# Patient Record
Sex: FEMALE | Race: OTHER | URBAN - METROPOLITAN AREA
[De-identification: names, ages, dates, MRNs, and addresses within clinical notes are randomized per-mention and may not be internally consistent; named-entity substitution may affect disease eponyms.]

---

## 2019-02-15 ENCOUNTER — INPATIENT (INPATIENT)
Facility: HOSPITAL | Age: 29
LOS: 2 days | Discharge: HOME | DRG: 832 | End: 2019-02-18
Attending: OBSTETRICS & GYNECOLOGY | Admitting: OBSTETRICS & GYNECOLOGY
Payer: COMMERCIAL

## 2019-02-15 VITALS
HEART RATE: 82 BPM | RESPIRATION RATE: 16 BRPM | HEIGHT: 65 IN | WEIGHT: 115.08 LBS | DIASTOLIC BLOOD PRESSURE: 63 MMHG | OXYGEN SATURATION: 99 % | SYSTOLIC BLOOD PRESSURE: 95 MMHG | TEMPERATURE: 98 F

## 2019-02-15 DIAGNOSIS — Z98.890 OTHER SPECIFIED POSTPROCEDURAL STATES: Chronic | ICD-10-CM

## 2019-02-15 DIAGNOSIS — O21.0 MILD HYPEREMESIS GRAVIDARUM: ICD-10-CM

## 2019-02-15 LAB
ALBUMIN SERPL ELPH-MCNC: 3.8 G/DL — SIGNIFICANT CHANGE UP (ref 3.5–5)
ALP SERPL-CCNC: 40 U/L — SIGNIFICANT CHANGE UP (ref 40–120)
ALT FLD-CCNC: 15 U/L DA — SIGNIFICANT CHANGE UP (ref 10–60)
ANION GAP SERPL CALC-SCNC: 8 MMOL/L — SIGNIFICANT CHANGE UP (ref 5–17)
APPEARANCE UR: CLEAR — SIGNIFICANT CHANGE UP
AST SERPL-CCNC: 11 U/L — SIGNIFICANT CHANGE UP (ref 10–40)
BASOPHILS # BLD AUTO: 0.06 K/UL — SIGNIFICANT CHANGE UP (ref 0–0.2)
BASOPHILS NFR BLD AUTO: 0.8 % — SIGNIFICANT CHANGE UP (ref 0–2)
BILIRUB SERPL-MCNC: 0.8 MG/DL — SIGNIFICANT CHANGE UP (ref 0.2–1.2)
BILIRUB UR-MCNC: NEGATIVE — SIGNIFICANT CHANGE UP
BUN SERPL-MCNC: 13 MG/DL — SIGNIFICANT CHANGE UP (ref 7–18)
CALCIUM SERPL-MCNC: 9.3 MG/DL — SIGNIFICANT CHANGE UP (ref 8.4–10.5)
CHLORIDE SERPL-SCNC: 104 MMOL/L — SIGNIFICANT CHANGE UP (ref 96–108)
CO2 SERPL-SCNC: 25 MMOL/L — SIGNIFICANT CHANGE UP (ref 22–31)
COLOR SPEC: YELLOW — SIGNIFICANT CHANGE UP
CREAT SERPL-MCNC: 0.67 MG/DL — SIGNIFICANT CHANGE UP (ref 0.5–1.3)
DIFF PNL FLD: ABNORMAL
EOSINOPHIL # BLD AUTO: 0.16 K/UL — SIGNIFICANT CHANGE UP (ref 0–0.5)
EOSINOPHIL NFR BLD AUTO: 2.1 % — SIGNIFICANT CHANGE UP (ref 0–6)
GLUCOSE SERPL-MCNC: 80 MG/DL — SIGNIFICANT CHANGE UP (ref 70–99)
GLUCOSE UR QL: NEGATIVE — SIGNIFICANT CHANGE UP
HCT VFR BLD CALC: 40.4 % — SIGNIFICANT CHANGE UP (ref 34.5–45)
HGB BLD-MCNC: 13.6 G/DL — SIGNIFICANT CHANGE UP (ref 11.5–15.5)
IMM GRANULOCYTES NFR BLD AUTO: 0.1 % — SIGNIFICANT CHANGE UP (ref 0–1.5)
KETONES UR-MCNC: ABNORMAL
LEUKOCYTE ESTERASE UR-ACNC: ABNORMAL
LYMPHOCYTES # BLD AUTO: 2.81 K/UL — SIGNIFICANT CHANGE UP (ref 1–3.3)
LYMPHOCYTES # BLD AUTO: 37.6 % — SIGNIFICANT CHANGE UP (ref 13–44)
MCHC RBC-ENTMCNC: 30.5 PG — SIGNIFICANT CHANGE UP (ref 27–34)
MCHC RBC-ENTMCNC: 33.7 GM/DL — SIGNIFICANT CHANGE UP (ref 32–36)
MCV RBC AUTO: 90.6 FL — SIGNIFICANT CHANGE UP (ref 80–100)
MONOCYTES # BLD AUTO: 0.67 K/UL — SIGNIFICANT CHANGE UP (ref 0–0.9)
MONOCYTES NFR BLD AUTO: 9 % — SIGNIFICANT CHANGE UP (ref 2–14)
NEUTROPHILS # BLD AUTO: 3.77 K/UL — SIGNIFICANT CHANGE UP (ref 1.8–7.4)
NEUTROPHILS NFR BLD AUTO: 50.4 % — SIGNIFICANT CHANGE UP (ref 43–77)
NITRITE UR-MCNC: NEGATIVE — SIGNIFICANT CHANGE UP
NRBC # BLD: 0 /100 WBCS — SIGNIFICANT CHANGE UP (ref 0–0)
PH UR: 6.5 — SIGNIFICANT CHANGE UP (ref 5–8)
PLATELET # BLD AUTO: 210 K/UL — SIGNIFICANT CHANGE UP (ref 150–400)
POTASSIUM SERPL-MCNC: 3.9 MMOL/L — SIGNIFICANT CHANGE UP (ref 3.5–5.3)
POTASSIUM SERPL-SCNC: 3.9 MMOL/L — SIGNIFICANT CHANGE UP (ref 3.5–5.3)
PROT SERPL-MCNC: 7.8 G/DL — SIGNIFICANT CHANGE UP (ref 6–8.3)
PROT UR-MCNC: 30 MG/DL
RBC # BLD: 4.46 M/UL — SIGNIFICANT CHANGE UP (ref 3.8–5.2)
RBC # FLD: 11.6 % — SIGNIFICANT CHANGE UP (ref 10.3–14.5)
SODIUM SERPL-SCNC: 137 MMOL/L — SIGNIFICANT CHANGE UP (ref 135–145)
SP GR SPEC: 1.01 — SIGNIFICANT CHANGE UP (ref 1.01–1.02)
UROBILINOGEN FLD QL: 1
WBC # BLD: 7.48 K/UL — SIGNIFICANT CHANGE UP (ref 3.8–10.5)
WBC # FLD AUTO: 7.48 K/UL — SIGNIFICANT CHANGE UP (ref 3.8–10.5)

## 2019-02-15 PROCEDURE — 99284 EMERGENCY DEPT VISIT MOD MDM: CPT

## 2019-02-15 PROCEDURE — 76817 TRANSVAGINAL US OBSTETRIC: CPT | Mod: 26

## 2019-02-15 PROCEDURE — 76801 OB US < 14 WKS SINGLE FETUS: CPT | Mod: 26

## 2019-02-15 PROCEDURE — 76815 OB US LIMITED FETUS(S): CPT | Mod: 26

## 2019-02-15 RX ORDER — ONDANSETRON 8 MG/1
4 TABLET, FILM COATED ORAL EVERY 6 HOURS
Qty: 0 | Refills: 0 | Status: DISCONTINUED | OUTPATIENT
Start: 2019-02-15 | End: 2019-02-17

## 2019-02-15 RX ORDER — ONDANSETRON 8 MG/1
4 TABLET, FILM COATED ORAL ONCE
Qty: 0 | Refills: 0 | Status: COMPLETED | OUTPATIENT
Start: 2019-02-15 | End: 2019-02-15

## 2019-02-15 RX ORDER — FAMOTIDINE 10 MG/ML
20 INJECTION INTRAVENOUS
Qty: 0 | Refills: 0 | Status: DISCONTINUED | OUTPATIENT
Start: 2019-02-15 | End: 2019-02-16

## 2019-02-15 RX ORDER — SODIUM CHLORIDE 9 MG/ML
1000 INJECTION, SOLUTION INTRAVENOUS
Qty: 0 | Refills: 0 | Status: DISCONTINUED | OUTPATIENT
Start: 2019-02-15 | End: 2019-02-18

## 2019-02-15 RX ORDER — SODIUM CHLORIDE 9 MG/ML
1000 INJECTION INTRAMUSCULAR; INTRAVENOUS; SUBCUTANEOUS ONCE
Qty: 0 | Refills: 0 | Status: COMPLETED | OUTPATIENT
Start: 2019-02-15 | End: 2019-02-15

## 2019-02-15 RX ADMIN — ONDANSETRON 4 MILLIGRAM(S): 8 TABLET, FILM COATED ORAL at 11:59

## 2019-02-15 RX ADMIN — ONDANSETRON 4 MILLIGRAM(S): 8 TABLET, FILM COATED ORAL at 17:33

## 2019-02-15 RX ADMIN — SODIUM CHLORIDE 1000 MILLILITER(S): 9 INJECTION INTRAMUSCULAR; INTRAVENOUS; SUBCUTANEOUS at 11:59

## 2019-02-15 RX ADMIN — FAMOTIDINE 20 MILLIGRAM(S): 10 INJECTION INTRAVENOUS at 17:34

## 2019-02-15 RX ADMIN — SODIUM CHLORIDE 125 MILLILITER(S): 9 INJECTION, SOLUTION INTRAVENOUS at 14:39

## 2019-02-15 RX ADMIN — Medication 25 MILLIGRAM(S): at 14:39

## 2019-02-15 NOTE — H&P ADULT - HISTORY OF PRESENT ILLNESS
30 year old female,  presents with "nausea and vomiting for 2 weeks". She is currently 8 weeks pregnant with LMP 2017. Pt states symptoms were exacerbated with any food and drink and describes the vomitting as non bloody, mucousy and yellowish in color.  Pt states she has not been able to keep anything down. Pt was given Diclegis and Zofran and found no relief. Pt states while in ER she has vomitted about 5 times even after being given medication. Pt admits to 5-7 pound weight loss over the past two weeks. She denies the following: fever, chills. abdominal pain. flank pain, dysuria, urgency, frequency, pelvic pain. She denies any recent travel.  POb/gynHx: primigravida, denies SAB or TOP, denies ovarian cysts, fibroids, stds, last pap last week, awaiting results   Allergies: NKA  Meds: PNV, Diclegis, zofran  PMHx: denies  PSHx: denies   PsocHx: denies x 3     Gen: A&O x 3, in mild distress due to vomitting at this time   Chest: CTA B/L  Cardiac: S1,S2  RRR  Abdomen: +BS; soft; Nontender, nondistended, no rebound or guarding   Extremities: Nontender, no edema    A/P: 30 year old female,  at 8 weeks gestation with hypermesis gravidarum   -admit to GYN   -keep NPO   -start Phenergan atc, Pepcid atc, Benadryl atc, vitamin b6 atc   -banana bag   -fetal sono   -cbc/cmp/acetone/prealbumin/triglyceride in AM  -UA in AM  -Nutrition consult   -case d/w Dr. Mercedes 28 year old female,  presents with "nausea and vomiting for 4 weeks". She is currently 10 weeks pregnant with LMP 2018. Pt states symptoms were exacerbated with any food and drink and describes the vomitting as non bloody, mucousy and yellowish in color.  Pt states she has not been able to keep anything down. Pt was given Diclegis and found no relief. She denies the following: fever, chills. abdominal pain. flank pain, dysuria, urgency, frequency, pelvic pain, vaginal bleeding. She denies any recent travel.  POb/gynHx: TOP w/o d&c x 1, hx of endometriosis with endometriosis s/p b/l ovarian cystectomy; denies fibroids, stds, last pap wnl, sexually active with 1 partner   Allergies: sulfa (hives), morphine (anaphylaxis)  Meds: PNV, Diclegis  PMHx: denies  PSHx: lap ovarian cystectomy b/l   PsocHx: denies x 3

## 2019-02-15 NOTE — H&P ADULT - ASSESSMENT
A/P: 28 year old female,  at 10 weeks gestation with hypermesis gravidarum   -admit to GYN   -keep NPO   -start Phenergan atc, Pepcid atc, Benadryl atc, vitamin b6 atc   -banana bag   -fetal sono   -cbc/cmp/acetone/prealbumin/triglyceride in AM  -UA in AM  -Nutrition consult   -case d/w Dr. Young A/P: 28 year old female,  at 10 weeks gestation with hypermesis gravidarum   -admit to GYN   -keep NPO   -start zofran atc Phenergan atc, Pepcid atc  -banana bag   -fetal sono   -cbc/cmp/acetone/prealbumin/triglyceride in AM  -UA in AM  -Nutrition consult   -case d/w Dr. Young

## 2019-02-15 NOTE — ED PROVIDER NOTE - CLINICAL SUMMARY MEDICAL DECISION MAKING FREE TEXT BOX
28 F present with hyper emesis. Care discussed with OB. Will obtain basic labs, hydrate, treat with IV zofran and admit.

## 2019-02-15 NOTE — ED PROVIDER NOTE - OBJECTIVE STATEMENT
29 yo female presenting with 1 month hx of nausea and vomiting.  Patient was sent in by OB doctor for admission, had urine done last week which showed significant dehydration.  Patient vomits 3-5x everyday.  Has been taking Diclegis but has not been able to keep it down.  Denies urinary symptoms or abdominal pain.  11 weeks pregnant.

## 2019-02-15 NOTE — ED PROVIDER NOTE - NS ED ROS FT
Constitutional: no fever  Eyes: no conjunctivitis  Ears: no ear pain   Nose: no nasal congestion, Mouth/Throat: no throat pain, Neck: no stiffness  Cardiovascular: no chest pain  Chest: no cough  Gastrointestinal: no abdominal pain, + vomiting and no diarrhea  MSK: no joint pain  : no dysuria  Skin: no rash  Neuro: no LOC

## 2019-02-15 NOTE — ED PROVIDER NOTE - ATTENDING CONTRIBUTION TO CARE
I completed an independent physical examination.   I have signed out the follow up of any pending tests (i.e. labs, radiological studies) to the Resident.  I have discussed the patient’s plan of care and disposition with the resident.    27 yo F with hyperemesis sent by OBGYN for admission. Care discussed with OB. Will obtain basic labs and admit to GYN.

## 2019-02-16 ENCOUNTER — TRANSCRIPTION ENCOUNTER (OUTPATIENT)
Age: 29
End: 2019-02-16

## 2019-02-16 DIAGNOSIS — O21.0 MILD HYPEREMESIS GRAVIDARUM: ICD-10-CM

## 2019-02-16 LAB
ACETONE SERPL-MCNC: NEGATIVE — SIGNIFICANT CHANGE UP
ALBUMIN SERPL ELPH-MCNC: 2.8 G/DL — LOW (ref 3.5–5)
ALP SERPL-CCNC: 32 U/L — LOW (ref 40–120)
ALT FLD-CCNC: 12 U/L DA — SIGNIFICANT CHANGE UP (ref 10–60)
ANION GAP SERPL CALC-SCNC: 9 MMOL/L — SIGNIFICANT CHANGE UP (ref 5–17)
APPEARANCE UR: CLEAR — SIGNIFICANT CHANGE UP
AST SERPL-CCNC: 9 U/L — LOW (ref 10–40)
BASOPHILS # BLD AUTO: 0.05 K/UL — SIGNIFICANT CHANGE UP (ref 0–0.2)
BASOPHILS NFR BLD AUTO: 0.8 % — SIGNIFICANT CHANGE UP (ref 0–2)
BILIRUB SERPL-MCNC: 1 MG/DL — SIGNIFICANT CHANGE UP (ref 0.2–1.2)
BILIRUB UR-MCNC: NEGATIVE — SIGNIFICANT CHANGE UP
BUN SERPL-MCNC: 9 MG/DL — SIGNIFICANT CHANGE UP (ref 7–18)
CALCIUM SERPL-MCNC: 8.2 MG/DL — LOW (ref 8.4–10.5)
CHLORIDE SERPL-SCNC: 106 MMOL/L — SIGNIFICANT CHANGE UP (ref 96–108)
CO2 SERPL-SCNC: 21 MMOL/L — LOW (ref 22–31)
COLOR SPEC: YELLOW — SIGNIFICANT CHANGE UP
CREAT SERPL-MCNC: 0.66 MG/DL — SIGNIFICANT CHANGE UP (ref 0.5–1.3)
CULTURE RESULTS: NO GROWTH — SIGNIFICANT CHANGE UP
DIFF PNL FLD: ABNORMAL
EOSINOPHIL # BLD AUTO: 0.1 K/UL — SIGNIFICANT CHANGE UP (ref 0–0.5)
EOSINOPHIL NFR BLD AUTO: 1.6 % — SIGNIFICANT CHANGE UP (ref 0–6)
GLUCOSE SERPL-MCNC: 59 MG/DL — LOW (ref 70–99)
GLUCOSE UR QL: NEGATIVE — SIGNIFICANT CHANGE UP
HCT VFR BLD CALC: 33.9 % — LOW (ref 34.5–45)
HGB BLD-MCNC: 11.3 G/DL — LOW (ref 11.5–15.5)
IMM GRANULOCYTES NFR BLD AUTO: 0.2 % — SIGNIFICANT CHANGE UP (ref 0–1.5)
KETONES UR-MCNC: ABNORMAL
LEUKOCYTE ESTERASE UR-ACNC: ABNORMAL
LYMPHOCYTES # BLD AUTO: 1.89 K/UL — SIGNIFICANT CHANGE UP (ref 1–3.3)
LYMPHOCYTES # BLD AUTO: 30.7 % — SIGNIFICANT CHANGE UP (ref 13–44)
MCHC RBC-ENTMCNC: 31 PG — SIGNIFICANT CHANGE UP (ref 27–34)
MCHC RBC-ENTMCNC: 33.3 GM/DL — SIGNIFICANT CHANGE UP (ref 32–36)
MCV RBC AUTO: 92.9 FL — SIGNIFICANT CHANGE UP (ref 80–100)
MONOCYTES # BLD AUTO: 0.43 K/UL — SIGNIFICANT CHANGE UP (ref 0–0.9)
MONOCYTES NFR BLD AUTO: 7 % — SIGNIFICANT CHANGE UP (ref 2–14)
NEUTROPHILS # BLD AUTO: 3.68 K/UL — SIGNIFICANT CHANGE UP (ref 1.8–7.4)
NEUTROPHILS NFR BLD AUTO: 59.7 % — SIGNIFICANT CHANGE UP (ref 43–77)
NITRITE UR-MCNC: NEGATIVE — SIGNIFICANT CHANGE UP
NRBC # BLD: 0 /100 WBCS — SIGNIFICANT CHANGE UP (ref 0–0)
PH UR: 6 — SIGNIFICANT CHANGE UP (ref 5–8)
PLATELET # BLD AUTO: 152 K/UL — SIGNIFICANT CHANGE UP (ref 150–400)
POTASSIUM SERPL-MCNC: 3.9 MMOL/L — SIGNIFICANT CHANGE UP (ref 3.5–5.3)
POTASSIUM SERPL-SCNC: 3.9 MMOL/L — SIGNIFICANT CHANGE UP (ref 3.5–5.3)
PREALB SERPL-MCNC: 16 MG/DL — LOW (ref 20–40)
PROT SERPL-MCNC: 5.9 G/DL — LOW (ref 6–8.3)
PROT UR-MCNC: NEGATIVE — SIGNIFICANT CHANGE UP
RBC # BLD: 3.65 M/UL — LOW (ref 3.8–5.2)
RBC # FLD: 11.8 % — SIGNIFICANT CHANGE UP (ref 10.3–14.5)
SODIUM SERPL-SCNC: 136 MMOL/L — SIGNIFICANT CHANGE UP (ref 135–145)
SP GR SPEC: 1.01 — SIGNIFICANT CHANGE UP (ref 1.01–1.02)
SPECIMEN SOURCE: SIGNIFICANT CHANGE UP
TRIGL SERPL-MCNC: 60 MG/DL — SIGNIFICANT CHANGE UP (ref 10–149)
UROBILINOGEN FLD QL: NEGATIVE — SIGNIFICANT CHANGE UP
WBC # BLD: 6.16 K/UL — SIGNIFICANT CHANGE UP (ref 3.8–10.5)
WBC # FLD AUTO: 6.16 K/UL — SIGNIFICANT CHANGE UP (ref 3.8–10.5)

## 2019-02-16 RX ORDER — FAMOTIDINE 10 MG/ML
20 INJECTION INTRAVENOUS
Qty: 0 | Refills: 0 | Status: DISCONTINUED | OUTPATIENT
Start: 2019-02-16 | End: 2019-02-18

## 2019-02-16 RX ORDER — ONDANSETRON 8 MG/1
4 TABLET, FILM COATED ORAL EVERY 6 HOURS
Qty: 0 | Refills: 0 | Status: DISCONTINUED | OUTPATIENT
Start: 2019-02-16 | End: 2019-02-18

## 2019-02-16 RX ORDER — SODIUM CHLORIDE 9 MG/ML
1000 INJECTION, SOLUTION INTRAVENOUS ONCE
Qty: 0 | Refills: 0 | Status: COMPLETED | OUTPATIENT
Start: 2019-02-16 | End: 2019-02-17

## 2019-02-16 RX ORDER — ONDANSETRON 8 MG/1
1 TABLET, FILM COATED ORAL
Qty: 90 | Refills: 0 | OUTPATIENT
Start: 2019-02-16 | End: 2019-03-17

## 2019-02-16 RX ADMIN — FAMOTIDINE 20 MILLIGRAM(S): 10 INJECTION INTRAVENOUS at 23:25

## 2019-02-16 RX ADMIN — ONDANSETRON 4 MILLIGRAM(S): 8 TABLET, FILM COATED ORAL at 06:37

## 2019-02-16 RX ADMIN — FAMOTIDINE 20 MILLIGRAM(S): 10 INJECTION INTRAVENOUS at 06:36

## 2019-02-16 RX ADMIN — FAMOTIDINE 20 MILLIGRAM(S): 10 INJECTION INTRAVENOUS at 17:45

## 2019-02-16 RX ADMIN — ONDANSETRON 4 MILLIGRAM(S): 8 TABLET, FILM COATED ORAL at 12:19

## 2019-02-16 RX ADMIN — SODIUM CHLORIDE 125 MILLILITER(S): 9 INJECTION, SOLUTION INTRAVENOUS at 12:19

## 2019-02-16 RX ADMIN — ONDANSETRON 4 MILLIGRAM(S): 8 TABLET, FILM COATED ORAL at 00:06

## 2019-02-16 RX ADMIN — Medication 25 MILLIGRAM(S): at 23:25

## 2019-02-16 RX ADMIN — Medication 25 MILLIGRAM(S): at 16:07

## 2019-02-16 RX ADMIN — ONDANSETRON 4 MILLIGRAM(S): 8 TABLET, FILM COATED ORAL at 17:45

## 2019-02-16 NOTE — DISCHARGE NOTE ADULT - CARE PLAN
Principal Discharge DX:	Hyperemesis gravidarum  Goal:	IV fluids with vitamins, PO challenge, fetal sono  Assessment and plan of treatment:	continue zofran and phenergen as prescribed, continue regular diet as tolerated or BRAT diet, f/u with Dr. Young on Tuesday in the office

## 2019-02-16 NOTE — DISCHARGE NOTE ADULT - ADDITIONAL INSTRUCTIONS
continue zofran and phenergen as prescribed, continue regular diet as tolerated or BRAT diet, f/u with Dr. Young on Tuesday in the office

## 2019-02-16 NOTE — DISCHARGE NOTE ADULT - PLAN OF CARE
IV fluids with vitamins, PO challenge, fetal sono continue zofran and phenergen as prescribed, continue regular diet as tolerated or BRAT diet, f/u with Dr. Young on Tuesday in the office

## 2019-02-16 NOTE — DISCHARGE NOTE ADULT - PATIENT PORTAL LINK FT
You can access the [facility] Patient Portal, offered by [facility], by registering with the following website: [email or web address]

## 2019-02-16 NOTE — PROGRESS NOTE ADULT - SUBJECTIVE AND OBJECTIVE BOX
GYN PA Progress Note HD#2    Patient seen and reevaluated at bedside. Offers no complaints at this time. Tolerated clear diet for breakfast. Denies abdominal pain, vaginal bleeding, vaginal discharge, vomiting, dizziness.  Vital Signs Last 24 Hrs  T(C): 37 (16 Feb 2019 05:18), Max: 37.1 (15 Feb 2019 15:56)  T(F): 98.6 (16 Feb 2019 05:18), Max: 98.8 (15 Feb 2019 15:56)  HR: 60 (16 Feb 2019 05:18) (60 - 82)  BP: 91/50 (16 Feb 2019 05:18) (91/47 - 96/53)  BP(mean): --  RR: 16 (16 Feb 2019 05:18) (16 - 16)  SpO2: 100% (16 Feb 2019 05:18) (94% - 100%)    gen aox3, not in acute distress  abd soft, non tender, no guarding, no rebound  gyn no VB or discharge noted                          11.3   6.16  )-----------( 152      ( 16 Feb 2019 06:21 )             33.9     Urinalysis (02.16.19 @ 09:30)    pH Urine: 6.0    Glucose Qualitative, Urine: Negative    Blood, Urine: Trace    Color: Yellow    Urine Appearance: Clear    Bilirubin: Negative    Ketone - Urine: Large    Specific Gravity: 1.015    Protein, Urine: Negative    Urobilinogen: Negative    Nitrite: Negative    Leukocyte Esterase Concentration: Trace    Acetone, Serum (02.16.19 @ 06:21)    Acetone, Serum: Negative      Comprehensive Metabolic Panel (02.16.19 @ 06:21)    Sodium, Serum: 136 mmol/L    Potassium, Serum: 3.9 mmol/L    Chloride, Serum: 106 mmol/L    Carbon Dioxide, Serum: 21 mmol/L    Anion Gap, Serum: 9 mmol/L    Blood Urea Nitrogen, Serum: 9 mg/dL    Creatinine, Serum: 0.66 mg/dL    Glucose, Serum: 59 mg/dL    Calcium, Total Serum: 8.2 mg/dL    Protein Total, Serum: 5.9 g/dL    Albumin, Serum: 2.8 g/dL    Bilirubin Total, Serum: 1.0 mg/dL    Alkaline Phosphatase, Serum: 32 U/L    Aspartate Aminotransferase (AST/SGOT): 9 U/L    Alanine Aminotransferase (ALT/SGPT): 12 U/L DA    eGFR if Non : 119: Interpretative comment  The units for eGFR are mL/min/1.73M2 (normalized body surface area). The  eGFR is calculated from a serum creatinine using the CKD-EPI equation.  Other variables required for calculation are race, age and sex. Among  patients with chronic kidney disease (CKD), the eGFR is useful in  determining the stage of disease according to KDOQI CKD classification.  All eGFR results are reported numerically with the following  interpretation.          GFR                    With                 Without     (ml/min/1.73 m2)    Kidney Damage       Kidney Damage        >= 90                    Stage 1                     Normal        60-89                    Stage 2                     Decreased GFR        30-59     Stage 3                     Stage 3        15-29                    Stage 4                     Stage 4        < 15                      Stage 5                     Stage 5  Each stage of CKD assumes that the associated GFR level has been in  effect for at least 3 months. Determination of stages one and two (with  eGFR > 59 ml/min/m2) requires estimation of kidney damage for at least 3  months as defined by structural or functional abnormalities.  Limitations: All estimates of GFR will be less accurate for patients at  extremes of muscle mass (including but not limited to frail elderly,  critically ill, or cancer patients), those with unusual diets, and those  with conditions associated with reduced secretion or extrarenal  elimination of creatinine. The eGFR equation is not recommended for use  in patients with unstable creatinine levels. mL/min/1.73M2    eGFR if African American: 137 mL/min/1.73M2    < from: US Transvaginal, OB (02.15.19 @ 16:33) >  Indication: Pregnant woman with hyperemesis.    Transabdominal and transvaginal obstetrical ultrasounds are performed and   they are reported together. No prior examination is available for   comparison. There is a single intrauterine gestation. The crown-rump   length of the fetus measures 4.26 cm, corresponding to a gestational age   of 11 weeks and 1 day. Expected date of delivery by ultrasound is   09/05/2019. There is positive fetal cardiac activity at 179 BPM. The yolk   sac appears unremarkable. The cervix isclosed. No evidence for a uterine   fibroid.    The right ovary measures 3.2 x 1.2 x 3.0 cm and the left ovary measures   2.4 x 2.9 x 2.0 cm. Both ovaries appear unremarkable. Duplex Doppler flow   is demonstrated for both ovaries. No pelvic free fluid.    Impression: Single live intrauterine gestation.    < end of copied text >

## 2019-02-16 NOTE — CHART NOTE - NSCHARTNOTEFT_GEN_A_CORE
BRINA SALAZAR EVENT NOTE     Pt seen at bedside, states she had regular diet for lunch has not had vomitted, although she still feels some nausea. Pt tolerated some dinner but still feels to have nausea, and is concerned to go home today. Will transition to PO meds and monitor overnight and repeat UA in am. For possible discharge in AM. case d/w Dr. Young

## 2019-02-16 NOTE — DISCHARGE NOTE ADULT - CARE PROVIDER_API CALL
Zuleyma Young)  Obstetrics and Gynecology  [facility] [address] [place] [address], [place] [address]  Phone: [phone]  Fax: [phone]  Follow Up Time:

## 2019-02-16 NOTE — DISCHARGE NOTE ADULT - MEDICATION SUMMARY - MEDICATIONS TO TAKE
I will START or STAY ON the medications listed below when I get home from the hospital:    Zofran ODT 4 mg oral tablet, disintegrating  -- 1 tab(s) by mouth 3 times a day MDD:prn  -- Indication: For Hyperemesis gravidarum    Phenergan 25 mg rectal suppository  -- 1 suppository(ies) rectally every 6 hours   -- For rectal use only.  Keep in refrigerator.  Do not freeze.  May cause drowsiness.  Alcohol may intensify this effect.  Use care when operating dangerous machinery.  Obtain medical advice before taking any non-prescription drugs as some may affect the action of this medication.    -- Indication: For Hyperemesis gravidarum

## 2019-02-16 NOTE — PROGRESS NOTE ADULT - ASSESSMENT
a/p: 27y/o 11 weeks 2 days hyperemesis gravidarum HD#2, improving  advance to regular diet for lunch  continue IV fluids, zofran, phenergen ATC  possible dc home today  dw Dr. Young

## 2019-02-17 DIAGNOSIS — O26.899 OTHER SPECIFIED PREGNANCY RELATED CONDITIONS, UNSPECIFIED TRIMESTER: ICD-10-CM

## 2019-02-17 LAB
APPEARANCE UR: CLEAR — SIGNIFICANT CHANGE UP
BILIRUB UR-MCNC: NEGATIVE — SIGNIFICANT CHANGE UP
COLOR SPEC: YELLOW — SIGNIFICANT CHANGE UP
DIFF PNL FLD: NEGATIVE — SIGNIFICANT CHANGE UP
GLUCOSE UR QL: NEGATIVE — SIGNIFICANT CHANGE UP
KETONES UR-MCNC: NEGATIVE — SIGNIFICANT CHANGE UP
LEUKOCYTE ESTERASE UR-ACNC: ABNORMAL
NITRITE UR-MCNC: NEGATIVE — SIGNIFICANT CHANGE UP
PH UR: 9 — HIGH (ref 5–8)
PROT UR-MCNC: NEGATIVE — SIGNIFICANT CHANGE UP
SP GR SPEC: 1.01 — SIGNIFICANT CHANGE UP (ref 1.01–1.02)
UROBILINOGEN FLD QL: NEGATIVE — SIGNIFICANT CHANGE UP

## 2019-02-17 RX ADMIN — FAMOTIDINE 20 MILLIGRAM(S): 10 INJECTION INTRAVENOUS at 17:17

## 2019-02-17 RX ADMIN — SODIUM CHLORIDE 125 MILLILITER(S): 9 INJECTION, SOLUTION INTRAVENOUS at 14:39

## 2019-02-17 RX ADMIN — ONDANSETRON 4 MILLIGRAM(S): 8 TABLET, FILM COATED ORAL at 12:16

## 2019-02-17 RX ADMIN — ONDANSETRON 4 MILLIGRAM(S): 8 TABLET, FILM COATED ORAL at 17:17

## 2019-02-17 RX ADMIN — SODIUM CHLORIDE 2000 MILLILITER(S): 9 INJECTION, SOLUTION INTRAVENOUS at 00:11

## 2019-02-17 RX ADMIN — FAMOTIDINE 20 MILLIGRAM(S): 10 INJECTION INTRAVENOUS at 06:19

## 2019-02-17 RX ADMIN — ONDANSETRON 4 MILLIGRAM(S): 8 TABLET, FILM COATED ORAL at 06:19

## 2019-02-17 RX ADMIN — ONDANSETRON 4 MILLIGRAM(S): 8 TABLET, FILM COATED ORAL at 06:17

## 2019-02-17 RX ADMIN — ONDANSETRON 4 MILLIGRAM(S): 8 TABLET, FILM COATED ORAL at 00:11

## 2019-02-17 NOTE — DIETITIAN INITIAL EVALUATION ADULT. - FACTORS AFF FOOD INTAKE
Yazidi/ethnic/cultural/personal food preferences/persistent nausea/vomiting PTA with hyperemesis gravidarum

## 2019-02-17 NOTE — DIETITIAN INITIAL EVALUATION ADULT. - MD RECOMMEND
Ensure Enlive 1can (240ml) x tid (1050kcal, 60g protein); Prenatal MVI as medically feasible when feeding better

## 2019-02-17 NOTE — DIETITIAN INITIAL EVALUATION ADULT. - ORAL INTAKE PTA
poor/x 4wks, unable to keep food down due to nausea/vomiting poor/x 4wks, often unable to keep food down due to nausea/vomiting

## 2019-02-17 NOTE — DIETITIAN INITIAL EVALUATION ADULT. - SIGNS/SYMPTOMS
unable to keep food down x 4 wks, no wt gain for pregnancy, but 1.7% wt loss x 10 wks often unable to keep food down x 4 wks, no wt gain for pregnancy, but 1.7% wt loss x 10 wks

## 2019-02-17 NOTE — DIETITIAN INITIAL EVALUATION ADULT. - OTHER INFO
Nutrition consult requested for assessment and education, Kcal count; from home; skin intact; IUP at 10 wks; diet advanced, tolerating small amount, no vomiting today, just nausea at times; food choices obtained, willing to try Ensure supplement; Prenatal MVI on hold at present.

## 2019-02-17 NOTE — CHART NOTE - NSCHARTNOTEFT_GEN_A_CORE
Upon Nutritional Assessment by the Registered Dietitian your patient was determined to meet criteria / has evidence of the following diagnosis/diagnoses:          [ ]  Mild Protein Calorie Malnutrition        [ X ]  Moderate Protein Calorie Malnutrition        [ ] Severe Protein Calorie Malnutrition        [ ] Unspecified Protein Calorie Malnutrition        [ ] Underweight / BMI <19        [ ] Morbid Obesity / BMI > 40      Findings as based on:  •  Comprehensive nutrition assessment and consultation  •  Calorie counts (nutrient intake analysis)  •  Food acceptance and intake status from observations by staff  •  Follow up  •  Patient education  •  Intervention secondary to interdisciplinary rounds  •   concerns      Treatment:    The following diet has been recommended: Ensure Enlive 1can (240ml) x tid (1050kcal, 60g protein); Prenatal-MVI as medically feasible when feeling better       PROVIDER Section:     By signing this assessment you are acknowledging and agree with the diagnosis/diagnoses assigned by the Registered Dietitian    Comments:

## 2019-02-17 NOTE — DIETITIAN INITIAL EVALUATION ADULT. - NUTRITION INTERVENTION
Medical Food Supplements/Feeding Assistance/Meals and Snack/Collaboration and Referral of Nutrition Care/Vitamin/Nutrition Education

## 2019-02-17 NOTE — PROGRESS NOTE ADULT - SUBJECTIVE AND OBJECTIVE BOX
HD#3   admitted hyperemesis gravidarum at 11+wks  pt states she still feels nausea  last vomit 3 days ago  pt on soft diet  pt on zofran odt and rectal suppos phenergan  aimee aceves  per nutrition: add ensure tid  pt denies LOF/vag bleeding/abd pain    Vital Signs Last 24 Hrs  T(C): 37 (2019 08:59), Max: 37.1 (2019 14:28)  T(F): 98.6 (2019 08:59), Max: 98.8 (2019 14:28)  HR: 75 (2019 08:59) (65 - 77)  BP: 91/46 (2019 08:59) (82/42 - 98/56)  BP(mean): --  RR: 16 (2019 08:59) (16 - 17)  SpO2: 100% (2019 08:59) (100% - 100%)    abd soft NT no guarding no distension  pelvic no LOF/vag bleeding                          11.3   6.16  )-----------( 152      ( 2019 06:21 )             33.9   02-16    136  |  106  |  9   ----------------------------<  59<L>  3.9   |  21<L>  |  0.66    Ca    8.2<L>      2019 06:21    TPro  5.9<L>  /  Alb  2.8<L>  /  TBili  1.0  /  DBili  x   /  AST  9<L>  /  ALT  12  /  AlkPhos  32<L>  02    Urinalysis Basic - ( 2019 07:38 )    Color: Yellow / Appearance: Clear / S.015 / pH: x  Gluc: x / Ketone: Negative  / Bili: Negative / Urobili: Negative   Blood: x / Protein: Negative / Nitrite: Negative   Leuk Esterase: Trace / RBC: 0-2 /HPF / WBC 3-5 /HPF   Sq Epi: x / Non Sq Epi: Few /HPF / Bacteria: Moderate /HPF    < from: US Echo OB <=14 Weeks, First Gestation (02.15.19 @ 16:33) >  EXAM:  US OB TRANSVAGINAL                          EXAM:  US OB LES THAN 14 WKS 1ST GEST                            PROCEDURE DATE:  02/15/2019          INTERPRETATION:  Transabdominal and transvaginal obstetrical ultrasounds    Indication: Pregnant woman with hyperemesis.    Transabdominal and transvaginal obstetrical ultrasounds are performed and   they are reported together. No prior examination is available for   comparison. There is a single intrauterine gestation. The crown-rump   length of the fetus measures 4.26 cm, corresponding to a gestational age   of 11 weeks and 1 day. Expected date of delivery by ultrasound is   2019. There is positive fetal cardiac activity at 179 BPM. The yolk   sac appears unremarkable. The cervix isclosed. No evidence for a uterine   fibroid.    The right ovary measures 3.2 x 1.2 x 3.0 cm and the left ovary measures   2.4 x 2.9 x 2.0 cm. Both ovaries appear unremarkable. Duplex Doppler flow   is demonstrated for both ovaries. No pelvic free fluid.    Impression: Single live intrauterine gestation.

## 2019-02-17 NOTE — DIETITIAN INITIAL EVALUATION ADULT. - PERTINENT MEDS FT
reviewed   MEDICATIONS  (STANDING):  famotidine    Tablet 20 milliGRAM(s) Oral two times a day  lactated ringers 1000 milliLiter(s) (125 mL/Hr) IV Continuous <Continuous>  ondansetron   Disintegrating Tablet 4 milliGRAM(s) Oral every 6 hours  promethazine Suppository 25 milliGRAM(s) Rectal every 8 hours    MEDICATIONS  (PRN):

## 2019-02-17 NOTE — DIETITIAN INITIAL EVALUATION ADULT. - NS AS NUTRI INTERV ED CONTENT
Priority modifications/Recommended modifications/Nutrition relationship to health/disease/Purpose of the nutrition education

## 2019-02-17 NOTE — PROGRESS NOTE ADULT - ASSESSMENT
HD#3   admitted hyperemesis gravidarum at 11+wks  pt states she still feels nausea  last vomit 3 days ago  pt on soft diet  pt on zofran odt and rectal suppos phenergan  per nutrition: ensure tid  -per dr aceves: rpt cbc/bmp at 1pm tomorrow  -urinalysis improved ketones by urine: negative  -soft diet w ensure tid per nutrition  -pt is aware she is for dc planning in am per dr aceves

## 2019-02-18 VITALS
SYSTOLIC BLOOD PRESSURE: 100 MMHG | TEMPERATURE: 98 F | DIASTOLIC BLOOD PRESSURE: 53 MMHG | RESPIRATION RATE: 16 BRPM | HEART RATE: 81 BPM | OXYGEN SATURATION: 100 %

## 2019-02-18 LAB
ANION GAP SERPL CALC-SCNC: 6 MMOL/L — SIGNIFICANT CHANGE UP (ref 5–17)
BASOPHILS # BLD AUTO: 0.02 K/UL — SIGNIFICANT CHANGE UP (ref 0–0.2)
BASOPHILS NFR BLD AUTO: 0.4 % — SIGNIFICANT CHANGE UP (ref 0–2)
BUN SERPL-MCNC: 6 MG/DL — LOW (ref 7–18)
CALCIUM SERPL-MCNC: 9.1 MG/DL — SIGNIFICANT CHANGE UP (ref 8.4–10.5)
CHLORIDE SERPL-SCNC: 108 MMOL/L — SIGNIFICANT CHANGE UP (ref 96–108)
CO2 SERPL-SCNC: 26 MMOL/L — SIGNIFICANT CHANGE UP (ref 22–31)
CREAT SERPL-MCNC: 0.81 MG/DL — SIGNIFICANT CHANGE UP (ref 0.5–1.3)
EOSINOPHIL # BLD AUTO: 0.11 K/UL — SIGNIFICANT CHANGE UP (ref 0–0.5)
EOSINOPHIL NFR BLD AUTO: 2.1 % — SIGNIFICANT CHANGE UP (ref 0–6)
GLUCOSE SERPL-MCNC: 125 MG/DL — HIGH (ref 70–99)
HCT VFR BLD CALC: 37.9 % — SIGNIFICANT CHANGE UP (ref 34.5–45)
HGB BLD-MCNC: 12.6 G/DL — SIGNIFICANT CHANGE UP (ref 11.5–15.5)
IMM GRANULOCYTES NFR BLD AUTO: 0.2 % — SIGNIFICANT CHANGE UP (ref 0–1.5)
LYMPHOCYTES # BLD AUTO: 1.41 K/UL — SIGNIFICANT CHANGE UP (ref 1–3.3)
LYMPHOCYTES # BLD AUTO: 26.5 % — SIGNIFICANT CHANGE UP (ref 13–44)
MCHC RBC-ENTMCNC: 30.7 PG — SIGNIFICANT CHANGE UP (ref 27–34)
MCHC RBC-ENTMCNC: 33.2 GM/DL — SIGNIFICANT CHANGE UP (ref 32–36)
MCV RBC AUTO: 92.4 FL — SIGNIFICANT CHANGE UP (ref 80–100)
MONOCYTES # BLD AUTO: 0.35 K/UL — SIGNIFICANT CHANGE UP (ref 0–0.9)
MONOCYTES NFR BLD AUTO: 6.6 % — SIGNIFICANT CHANGE UP (ref 2–14)
NEUTROPHILS # BLD AUTO: 3.43 K/UL — SIGNIFICANT CHANGE UP (ref 1.8–7.4)
NEUTROPHILS NFR BLD AUTO: 64.2 % — SIGNIFICANT CHANGE UP (ref 43–77)
NRBC # BLD: 0 /100 WBCS — SIGNIFICANT CHANGE UP (ref 0–0)
PLATELET # BLD AUTO: 164 K/UL — SIGNIFICANT CHANGE UP (ref 150–400)
POTASSIUM SERPL-MCNC: 3.6 MMOL/L — SIGNIFICANT CHANGE UP (ref 3.5–5.3)
POTASSIUM SERPL-SCNC: 3.6 MMOL/L — SIGNIFICANT CHANGE UP (ref 3.5–5.3)
RBC # BLD: 4.1 M/UL — SIGNIFICANT CHANGE UP (ref 3.8–5.2)
RBC # FLD: 11.9 % — SIGNIFICANT CHANGE UP (ref 10.3–14.5)
SODIUM SERPL-SCNC: 140 MMOL/L — SIGNIFICANT CHANGE UP (ref 135–145)
WBC # BLD: 5.33 K/UL — SIGNIFICANT CHANGE UP (ref 3.8–10.5)
WBC # FLD AUTO: 5.33 K/UL — SIGNIFICANT CHANGE UP (ref 3.8–10.5)

## 2019-02-18 PROCEDURE — 81001 URINALYSIS AUTO W/SCOPE: CPT

## 2019-02-18 PROCEDURE — 76817 TRANSVAGINAL US OBSTETRIC: CPT

## 2019-02-18 PROCEDURE — 86480 TB TEST CELL IMMUN MEASURE: CPT

## 2019-02-18 PROCEDURE — 76815 OB US LIMITED FETUS(S): CPT

## 2019-02-18 PROCEDURE — 76801 OB US < 14 WKS SINGLE FETUS: CPT

## 2019-02-18 PROCEDURE — 36415 COLL VENOUS BLD VENIPUNCTURE: CPT

## 2019-02-18 PROCEDURE — 99285 EMERGENCY DEPT VISIT HI MDM: CPT | Mod: 25

## 2019-02-18 PROCEDURE — 80048 BASIC METABOLIC PNL TOTAL CA: CPT

## 2019-02-18 PROCEDURE — 84134 ASSAY OF PREALBUMIN: CPT

## 2019-02-18 PROCEDURE — 85027 COMPLETE CBC AUTOMATED: CPT

## 2019-02-18 PROCEDURE — 82009 KETONE BODYS QUAL: CPT

## 2019-02-18 PROCEDURE — 96374 THER/PROPH/DIAG INJ IV PUSH: CPT

## 2019-02-18 PROCEDURE — 84478 ASSAY OF TRIGLYCERIDES: CPT

## 2019-02-18 PROCEDURE — 80053 COMPREHEN METABOLIC PANEL: CPT

## 2019-02-18 PROCEDURE — 87086 URINE CULTURE/COLONY COUNT: CPT

## 2019-02-18 RX ADMIN — ONDANSETRON 4 MILLIGRAM(S): 8 TABLET, FILM COATED ORAL at 12:35

## 2019-02-18 RX ADMIN — ONDANSETRON 4 MILLIGRAM(S): 8 TABLET, FILM COATED ORAL at 00:12

## 2019-02-18 RX ADMIN — Medication 25 MILLIGRAM(S): at 06:16

## 2019-02-18 RX ADMIN — FAMOTIDINE 20 MILLIGRAM(S): 10 INJECTION INTRAVENOUS at 06:16

## 2019-02-18 RX ADMIN — ONDANSETRON 4 MILLIGRAM(S): 8 TABLET, FILM COATED ORAL at 06:16

## 2019-02-18 RX ADMIN — Medication 25 MILLIGRAM(S): at 13:51

## 2019-02-18 RX ADMIN — FAMOTIDINE 20 MILLIGRAM(S): 10 INJECTION INTRAVENOUS at 17:08

## 2019-02-18 RX ADMIN — ONDANSETRON 4 MILLIGRAM(S): 8 TABLET, FILM COATED ORAL at 17:08

## 2019-02-18 NOTE — PROGRESS NOTE ADULT - PROBLEM SELECTOR PLAN 1
-cont close monitor  -d/c home today  -f/u rpt cbc/bmp   -instructions verbalized   -d/w Dr Young present
HD#3   admitted hyperemesis gravidarum at 11+wks  pt states she still feels nausea  last vomit 3 days ago  pt on soft diet  pt on zofran odt and rectal suppos phenergan  per nutrition: ensure tid  -per dr aceves: rpt cbc/bmp at 1pm tomorrow  -urinalysis improved ketones by urine: negative  -soft diet w ensure tid per nutrition  -pt is aware she is for dc planning in am per dr aceves
a/p: 29y/o 11 weeks 2 days hyperemesis gravidarum HD#2, improving  advance to regular diet for lunch  continue IV fluids, zofran, phenergen ATC  possible dc home today  dw Dr. Young
no

## 2019-02-18 NOTE — PROGRESS NOTE ADULT - SUBJECTIVE AND OBJECTIVE BOX
Pt seen at bedside offers no complaints states tolerating PO, +mild nausea no vomit. no vb, no leaking of fluid or abnormal discharge, no abd pain, no fever, or chills, no cp; no sob; no h/a, blurry vision or epigastric pain, no dysuria, urgency or frequency. +voiding without difficulty. Last BM yesterday.      Vital Signs Last 24 Hrs  T(C): 36.9 (2019 06:00), Max: 37 (2019 08:59)  T(F): 98.5 (2019 06:00), Max: 98.6 (2019 08:59)  HR: 59 (2019 06:00) (50 - 75)  BP: 93/54 (2019 06:00) (89/45 - 98/46)  BP(mean): 56 (2019 20:49) (56 - 56)  RR: 16 (2019 06:00) (16 - 17)  SpO2: 98% (2019 06:00) (98% - 100%)    PE:   Gen: A&Ox3; NAD  Abd: soft/nt/gravid  back: no cvat b/l  sve: defered  ext: no calf pain; edema 1+b/l, dtr's 2+b/l, venodynes in place    Urinalysis Basic - ( 2019 07:38 )    Color: Yellow / Appearance: Clear / S.015 / pH: x  Gluc: x / Ketone: Negative  / Bili: Negative / Urobili: Negative   Blood: x / Protein: Negative / Nitrite: Negative   Leuk Esterase: Trace / RBC: 0-2 /HPF / WBC 3-5 /HPF   Sq Epi: x / Non Sq Epi: Few /HPF / Bacteria: Moderate /HPF    a/p siup @11.1wks w hyperemesis gravidarum   -cont close monitor  -d/c home today  -f/u rpt cbc/bmp   -instructions verbalized   -d/w Dr Young present

## 2019-02-21 LAB
GAMMA INTERFERON BACKGROUND BLD IA-ACNC: 0.02 IU/ML — SIGNIFICANT CHANGE UP
M TB IFN-G BLD-IMP: NEGATIVE — SIGNIFICANT CHANGE UP
M TB IFN-G CD4+ BCKGRND COR BLD-ACNC: 0 IU/ML — SIGNIFICANT CHANGE UP
M TB IFN-G CD4+CD8+ BCKGRND COR BLD-ACNC: 0.01 IU/ML — SIGNIFICANT CHANGE UP
QUANT TB PLUS MITOGEN MINUS NIL: 6.37 IU/ML — SIGNIFICANT CHANGE UP

## 2019-02-28 ENCOUNTER — LABORATORY RESULT (OUTPATIENT)
Age: 29
End: 2019-02-28

## 2019-02-28 ENCOUNTER — ASOB RESULT (OUTPATIENT)
Age: 29
End: 2019-02-28

## 2019-02-28 ENCOUNTER — APPOINTMENT (OUTPATIENT)
Dept: ANTEPARTUM | Facility: CLINIC | Age: 29
End: 2019-02-28
Payer: COMMERCIAL

## 2019-02-28 DIAGNOSIS — Z34.90 ENCOUNTER FOR SUPERVISION OF NORMAL PREGNANCY, UNSPECIFIED, UNSPECIFIED TRIMESTER: ICD-10-CM

## 2019-02-28 PROCEDURE — 36416 COLLJ CAPILLARY BLOOD SPEC: CPT

## 2019-02-28 PROCEDURE — 76813 OB US NUCHAL MEAS 1 GEST: CPT

## 2019-02-28 PROCEDURE — 76801 OB US < 14 WKS SINGLE FETUS: CPT

## 2019-03-01 ENCOUNTER — TRANSCRIPTION ENCOUNTER (OUTPATIENT)
Age: 29
End: 2019-03-01

## 2019-03-01 ENCOUNTER — INPATIENT (INPATIENT)
Facility: HOSPITAL | Age: 29
LOS: 3 days | Discharge: HOME | DRG: 832 | End: 2019-03-05
Attending: OBSTETRICS & GYNECOLOGY | Admitting: OBSTETRICS & GYNECOLOGY
Payer: COMMERCIAL

## 2019-03-01 VITALS
OXYGEN SATURATION: 100 % | SYSTOLIC BLOOD PRESSURE: 95 MMHG | DIASTOLIC BLOOD PRESSURE: 69 MMHG | TEMPERATURE: 98 F | RESPIRATION RATE: 18 BRPM | HEART RATE: 95 BPM

## 2019-03-01 DIAGNOSIS — O21.0 MILD HYPEREMESIS GRAVIDARUM: ICD-10-CM

## 2019-03-01 DIAGNOSIS — Z98.890 OTHER SPECIFIED POSTPROCEDURAL STATES: Chronic | ICD-10-CM

## 2019-03-01 LAB
ACETONE SERPL-MCNC: NEGATIVE — SIGNIFICANT CHANGE UP
ALBUMIN SERPL ELPH-MCNC: 3.4 G/DL — LOW (ref 3.5–5)
ALP SERPL-CCNC: 38 U/L — LOW (ref 40–120)
ALT FLD-CCNC: 18 U/L DA — SIGNIFICANT CHANGE UP (ref 10–60)
ANION GAP SERPL CALC-SCNC: 8 MMOL/L — SIGNIFICANT CHANGE UP (ref 5–17)
APPEARANCE UR: CLEAR — SIGNIFICANT CHANGE UP
AST SERPL-CCNC: 27 U/L — SIGNIFICANT CHANGE UP (ref 10–40)
BASOPHILS # BLD AUTO: 0.02 K/UL — SIGNIFICANT CHANGE UP (ref 0–0.2)
BASOPHILS NFR BLD AUTO: 0.3 % — SIGNIFICANT CHANGE UP (ref 0–2)
BILIRUB SERPL-MCNC: 0.6 MG/DL — SIGNIFICANT CHANGE UP (ref 0.2–1.2)
BILIRUB UR-MCNC: NEGATIVE — SIGNIFICANT CHANGE UP
BUN SERPL-MCNC: 16 MG/DL — SIGNIFICANT CHANGE UP (ref 7–18)
CALCIUM SERPL-MCNC: 8.8 MG/DL — SIGNIFICANT CHANGE UP (ref 8.4–10.5)
CHLORIDE SERPL-SCNC: 106 MMOL/L — SIGNIFICANT CHANGE UP (ref 96–108)
CO2 SERPL-SCNC: 24 MMOL/L — SIGNIFICANT CHANGE UP (ref 22–31)
COLOR SPEC: YELLOW — SIGNIFICANT CHANGE UP
CREAT SERPL-MCNC: 0.67 MG/DL — SIGNIFICANT CHANGE UP (ref 0.5–1.3)
DIFF PNL FLD: ABNORMAL
EOSINOPHIL # BLD AUTO: 0.08 K/UL — SIGNIFICANT CHANGE UP (ref 0–0.5)
EOSINOPHIL NFR BLD AUTO: 1.2 % — SIGNIFICANT CHANGE UP (ref 0–6)
GLUCOSE SERPL-MCNC: 74 MG/DL — SIGNIFICANT CHANGE UP (ref 70–99)
GLUCOSE UR QL: NEGATIVE — SIGNIFICANT CHANGE UP
HCG SERPL-ACNC: HIGH MIU/ML
HCT VFR BLD CALC: 38.1 % — SIGNIFICANT CHANGE UP (ref 34.5–45)
HGB BLD-MCNC: 12.5 G/DL — SIGNIFICANT CHANGE UP (ref 11.5–15.5)
IMM GRANULOCYTES NFR BLD AUTO: 0.3 % — SIGNIFICANT CHANGE UP (ref 0–1.5)
KETONES UR-MCNC: NEGATIVE — SIGNIFICANT CHANGE UP
LEUKOCYTE ESTERASE UR-ACNC: ABNORMAL
LYMPHOCYTES # BLD AUTO: 2.13 K/UL — SIGNIFICANT CHANGE UP (ref 1–3.3)
LYMPHOCYTES # BLD AUTO: 31.9 % — SIGNIFICANT CHANGE UP (ref 13–44)
MCHC RBC-ENTMCNC: 30.2 PG — SIGNIFICANT CHANGE UP (ref 27–34)
MCHC RBC-ENTMCNC: 32.8 GM/DL — SIGNIFICANT CHANGE UP (ref 32–36)
MCV RBC AUTO: 92 FL — SIGNIFICANT CHANGE UP (ref 80–100)
MONOCYTES # BLD AUTO: 0.5 K/UL — SIGNIFICANT CHANGE UP (ref 0–0.9)
MONOCYTES NFR BLD AUTO: 7.5 % — SIGNIFICANT CHANGE UP (ref 2–14)
NEUTROPHILS # BLD AUTO: 3.93 K/UL — SIGNIFICANT CHANGE UP (ref 1.8–7.4)
NEUTROPHILS NFR BLD AUTO: 58.8 % — SIGNIFICANT CHANGE UP (ref 43–77)
NITRITE UR-MCNC: NEGATIVE — SIGNIFICANT CHANGE UP
NRBC # BLD: 0 /100 WBCS — SIGNIFICANT CHANGE UP (ref 0–0)
PH UR: 6 — SIGNIFICANT CHANGE UP (ref 5–8)
PLATELET # BLD AUTO: 227 K/UL — SIGNIFICANT CHANGE UP (ref 150–400)
POTASSIUM SERPL-MCNC: 4.3 MMOL/L — SIGNIFICANT CHANGE UP (ref 3.5–5.3)
POTASSIUM SERPL-SCNC: 4.3 MMOL/L — SIGNIFICANT CHANGE UP (ref 3.5–5.3)
PROT SERPL-MCNC: 7.6 G/DL — SIGNIFICANT CHANGE UP (ref 6–8.3)
PROT UR-MCNC: 30 MG/DL
RBC # BLD: 4.14 M/UL — SIGNIFICANT CHANGE UP (ref 3.8–5.2)
RBC # FLD: 11.9 % — SIGNIFICANT CHANGE UP (ref 10.3–14.5)
SODIUM SERPL-SCNC: 138 MMOL/L — SIGNIFICANT CHANGE UP (ref 135–145)
SP GR SPEC: 1.02 — SIGNIFICANT CHANGE UP (ref 1.01–1.02)
UROBILINOGEN FLD QL: 1
WBC # BLD: 6.68 K/UL — SIGNIFICANT CHANGE UP (ref 3.8–10.5)
WBC # FLD AUTO: 6.68 K/UL — SIGNIFICANT CHANGE UP (ref 3.8–10.5)

## 2019-03-01 PROCEDURE — 99285 EMERGENCY DEPT VISIT HI MDM: CPT

## 2019-03-01 RX ORDER — ONDANSETRON 8 MG/1
4 TABLET, FILM COATED ORAL ONCE
Qty: 0 | Refills: 0 | Status: COMPLETED | OUTPATIENT
Start: 2019-03-01 | End: 2019-03-01

## 2019-03-01 RX ORDER — SODIUM CHLORIDE 9 MG/ML
2000 INJECTION INTRAMUSCULAR; INTRAVENOUS; SUBCUTANEOUS ONCE
Qty: 0 | Refills: 0 | Status: COMPLETED | OUTPATIENT
Start: 2019-03-01 | End: 2019-03-01

## 2019-03-01 RX ORDER — SODIUM CHLORIDE 9 MG/ML
1000 INJECTION, SOLUTION INTRAVENOUS
Qty: 0 | Refills: 0 | Status: DISCONTINUED | OUTPATIENT
Start: 2019-03-01 | End: 2019-03-05

## 2019-03-01 RX ORDER — SODIUM CHLORIDE 9 MG/ML
1000 INJECTION, SOLUTION INTRAVENOUS
Qty: 0 | Refills: 0 | Status: DISCONTINUED | OUTPATIENT
Start: 2019-03-01 | End: 2019-03-01

## 2019-03-01 RX ORDER — FAMOTIDINE 10 MG/ML
20 INJECTION INTRAVENOUS
Qty: 0 | Refills: 0 | Status: DISCONTINUED | OUTPATIENT
Start: 2019-03-01 | End: 2019-03-03

## 2019-03-01 RX ORDER — CEFTRIAXONE 500 MG/1
1 INJECTION, POWDER, FOR SOLUTION INTRAMUSCULAR; INTRAVENOUS ONCE
Qty: 0 | Refills: 0 | Status: COMPLETED | OUTPATIENT
Start: 2019-03-01 | End: 2019-03-01

## 2019-03-01 RX ORDER — ONDANSETRON 8 MG/1
4 TABLET, FILM COATED ORAL EVERY 6 HOURS
Qty: 0 | Refills: 0 | Status: DISCONTINUED | OUTPATIENT
Start: 2019-03-01 | End: 2019-03-02

## 2019-03-01 RX ORDER — METOCLOPRAMIDE HCL 10 MG
10 TABLET ORAL ONCE
Qty: 0 | Refills: 0 | Status: COMPLETED | OUTPATIENT
Start: 2019-03-01 | End: 2019-03-01

## 2019-03-01 RX ADMIN — Medication 10 MILLIGRAM(S): at 14:01

## 2019-03-01 RX ADMIN — SODIUM CHLORIDE 2000 MILLILITER(S): 9 INJECTION INTRAMUSCULAR; INTRAVENOUS; SUBCUTANEOUS at 14:01

## 2019-03-01 RX ADMIN — ONDANSETRON 4 MILLIGRAM(S): 8 TABLET, FILM COATED ORAL at 14:01

## 2019-03-01 RX ADMIN — FAMOTIDINE 20 MILLIGRAM(S): 10 INJECTION INTRAVENOUS at 17:19

## 2019-03-01 RX ADMIN — CEFTRIAXONE 100 GRAM(S): 500 INJECTION, POWDER, FOR SOLUTION INTRAMUSCULAR; INTRAVENOUS at 15:59

## 2019-03-01 RX ADMIN — SODIUM CHLORIDE 125 MILLILITER(S): 9 INJECTION, SOLUTION INTRAVENOUS at 19:20

## 2019-03-01 NOTE — ED PROVIDER NOTE - CARE PLAN
Principal Discharge DX:	Hyperemesis gravidarum  Secondary Diagnosis:	Urinary tract infection in mother during second trimester of pregnancy

## 2019-03-01 NOTE — H&P ADULT - ATTENDING COMMENTS
28 y.o  at first trimester with hyperemesis gravidarum who is admitted(for second time last 2 weeks) for IV hydration/antihemetics.

## 2019-03-01 NOTE — H&P ADULT - NSHPPHYSICALEXAM_GEN_ALL_CORE
Gen: A&O x 3, NAD  Chest: CTA B/L  Cardiac: S1,S2  RRR  Abdomen: +BS; soft; Nontender, nondistended, no rebound or guarding   Extremities: Nontender, no edema

## 2019-03-01 NOTE — ED ADULT NURSE NOTE - NSIMPLEMENTINTERV_GEN_ALL_ED
Implemented All Universal Safety Interventions:  [place] to call system. Call bell, personal items and telephone within reach. Instruct patient to call for assistance. Room bathroom lighting operational. Non-slip footwear when patient is off stretcher. Physically safe environment: no spills, clutter or unnecessary equipment. Stretcher in lowest position, wheels locked, appropriate side rails in place.

## 2019-03-01 NOTE — H&P ADULT - ASSESSMENT
A/P: 28 year old female,  at 12 weeks gestation with hypermesis gravidarum   -admit to GYN   -keep NPO   -start zofran atc Phenergan atc, Pepcid atc  -banana bag   -case d/w Dr. Young

## 2019-03-01 NOTE — ED PROVIDER NOTE - PROGRESS NOTE DETAILS
UA shows infection. Culture sent. Discussed with OBGYN team MARIE Lindsey. Will admit for antibiotics, hydration. Patient aware.

## 2019-03-01 NOTE — H&P ADULT - NSHPLABSRESULTS_GEN_ALL_CORE
12.5   6.68  )-----------( 227      ( 01 Mar 2019 14:16 )             38.1   Urinalysis Basic - ( 01 Mar 2019 14:16 )    Color: Yellow / Appearance: Clear / S.020 / pH: x  Gluc: x / Ketone: Negative  / Bili: Negative / Urobili: 1   Blood: x / Protein: 30 mg/dL / Nitrite: Negative   Leuk Esterase: Moderate / RBC: 5-10 /HPF / WBC 11-25 /HPF   Sq Epi: x / Non Sq Epi: Many /HPF / Bacteria: Many /HPF

## 2019-03-01 NOTE — ED PROVIDER NOTE - CLINICAL SUMMARY MEDICAL DECISION MAKING FREE TEXT BOX
27 y/o female presents with persistent hyperemesis gravidarum which she was recently admitted for. Will do labs, UA, OB consult, provide IV fluids, meds, and reassess.

## 2019-03-01 NOTE — H&P ADULT - HISTORY OF PRESENT ILLNESS
27 y/o female  @12 weeks pregnant, EDC 2019 presents with nausea, vomiting, and inability to keep food down. Patient was admitted for hyperemesis gravidarium 2/15/2019. Pt reports that whenever he eat anything it comes back out. Pt also reports associated dizziness described as lightheadedness that is triggered by positional change. Pt has tried Diclegis, Zofran, and promethazine without any relief of Sx. Pt was seen by Dr. Young a few days ago and was told to come back for IV hydration and treatment. Patient was admitted for hyperemesis 2/15/2019,  Pt denies any chest pain, abd pain, urinary Sx, vaginal bleeding, unusual discharge, fever, chills, or other complaints.    POb/gynHx: TOP w/o d&c x 1, hx of endometriosis with endometriosis s/p b/l ovarian cystectomy; denies fibroids, stds, last pap wnl, sexually active with 1 partner   Allergies: sulfa (hives), morphine (anaphylaxis)  Meds: PNV, Diclegis  PMHx: denies  PSHx: lap ovarian cystectomy b/l   PsocHx: denies x 3 27 y/o female  @12 weeks pregnant, EDC 2019 presents with nausea, vomiting, and inability to keep food down. Patient was admitted to [facility] for hyperemesis gravidarium 2/15/2019 and discharged home on zofran and phenergan. Pt reports that whenever she eat anything it comes back out.  Pt has tried Diclegis, Zofran, and phenergan without any relief of Sx. Patient admits she has not be compliant with phenergan daily.  Pt was seen by Dr. Young a few days ago and was told to come back for IV hydration and treatment.   Pt denies any chest pain, abd pain, urinary Sx, vaginal bleeding, leakage of fluids, unusual discharge, fever, chills, or  any other complaints.    POb/gynHx: TOP w/o d&c x 1, hx of endometriosis s/p b/l ovarian cystectomy; denies fibroids, stds, last pap wnl, sexually active with 1 partner   Allergies: sulfa (hives), morphine (anaphylaxis)  Meds: PNV, zofran  PMHx: denies  PSHx: lap ovarian cystectomy b/l, D+C  PsocHx: denies x 3

## 2019-03-02 DIAGNOSIS — O23.42 UNSPECIFIED INFECTION OF URINARY TRACT IN PREGNANCY, SECOND TRIMESTER: ICD-10-CM

## 2019-03-02 LAB
ANION GAP SERPL CALC-SCNC: 5 MMOL/L — SIGNIFICANT CHANGE UP (ref 5–17)
BUN SERPL-MCNC: 6 MG/DL — LOW (ref 7–18)
CALCIUM SERPL-MCNC: 8.1 MG/DL — LOW (ref 8.4–10.5)
CHLORIDE SERPL-SCNC: 109 MMOL/L — HIGH (ref 96–108)
CO2 SERPL-SCNC: 25 MMOL/L — SIGNIFICANT CHANGE UP (ref 22–31)
CREAT SERPL-MCNC: 0.57 MG/DL — SIGNIFICANT CHANGE UP (ref 0.5–1.3)
CULTURE RESULTS: NO GROWTH — SIGNIFICANT CHANGE UP
GLUCOSE SERPL-MCNC: 77 MG/DL — SIGNIFICANT CHANGE UP (ref 70–99)
POTASSIUM SERPL-MCNC: 3.8 MMOL/L — SIGNIFICANT CHANGE UP (ref 3.5–5.3)
POTASSIUM SERPL-SCNC: 3.8 MMOL/L — SIGNIFICANT CHANGE UP (ref 3.5–5.3)
SODIUM SERPL-SCNC: 139 MMOL/L — SIGNIFICANT CHANGE UP (ref 135–145)
SPECIMEN SOURCE: SIGNIFICANT CHANGE UP

## 2019-03-02 RX ORDER — ONDANSETRON 8 MG/1
4 TABLET, FILM COATED ORAL EVERY 6 HOURS
Qty: 0 | Refills: 0 | Status: DISCONTINUED | OUTPATIENT
Start: 2019-03-02 | End: 2019-03-03

## 2019-03-02 RX ORDER — SODIUM CHLORIDE 9 MG/ML
1000 INJECTION, SOLUTION INTRAVENOUS
Qty: 0 | Refills: 0 | Status: DISCONTINUED | OUTPATIENT
Start: 2019-03-02 | End: 2019-03-05

## 2019-03-02 RX ORDER — ACETAMINOPHEN 500 MG
325 TABLET ORAL EVERY 4 HOURS
Qty: 0 | Refills: 0 | Status: DISCONTINUED | OUTPATIENT
Start: 2019-03-02 | End: 2019-03-05

## 2019-03-02 RX ORDER — CEFTRIAXONE 500 MG/1
1 INJECTION, POWDER, FOR SOLUTION INTRAMUSCULAR; INTRAVENOUS EVERY 24 HOURS
Qty: 0 | Refills: 0 | Status: DISCONTINUED | OUTPATIENT
Start: 2019-03-02 | End: 2019-03-04

## 2019-03-02 RX ORDER — SODIUM CHLORIDE 9 MG/ML
500 INJECTION INTRAMUSCULAR; INTRAVENOUS; SUBCUTANEOUS ONCE
Qty: 0 | Refills: 0 | Status: COMPLETED | OUTPATIENT
Start: 2019-03-02 | End: 2019-03-02

## 2019-03-02 RX ADMIN — ONDANSETRON 4 MILLIGRAM(S): 8 TABLET, FILM COATED ORAL at 15:20

## 2019-03-02 RX ADMIN — Medication 325 MILLIGRAM(S): at 16:20

## 2019-03-02 RX ADMIN — CEFTRIAXONE 100 GRAM(S): 500 INJECTION, POWDER, FOR SOLUTION INTRAMUSCULAR; INTRAVENOUS at 19:04

## 2019-03-02 RX ADMIN — SODIUM CHLORIDE 150 MILLILITER(S): 9 INJECTION, SOLUTION INTRAVENOUS at 09:33

## 2019-03-02 RX ADMIN — SODIUM CHLORIDE 125 MILLILITER(S): 9 INJECTION, SOLUTION INTRAVENOUS at 20:11

## 2019-03-02 RX ADMIN — FAMOTIDINE 20 MILLIGRAM(S): 10 INJECTION INTRAVENOUS at 17:57

## 2019-03-02 RX ADMIN — SODIUM CHLORIDE 1000 MILLILITER(S): 9 INJECTION INTRAMUSCULAR; INTRAVENOUS; SUBCUTANEOUS at 08:31

## 2019-03-02 RX ADMIN — ONDANSETRON 4 MILLIGRAM(S): 8 TABLET, FILM COATED ORAL at 23:22

## 2019-03-02 RX ADMIN — Medication 325 MILLIGRAM(S): at 15:20

## 2019-03-02 RX ADMIN — FAMOTIDINE 20 MILLIGRAM(S): 10 INJECTION INTRAVENOUS at 05:14

## 2019-03-02 NOTE — PROGRESS NOTE ADULT - ASSESSMENT
HD#2  pt re admitted for hyperemesis gravidarum w 11lb weight loss  -bedside sono +FHR 145bpm  MEDICATIONS  (STANDING):  dextrose 5% + lactated ringers. 1000 milliLiter(s) (150 mL/Hr) IV Continuous <Continuous>  famotidine Injectable 20 milliGRAM(s) IV Push two times a day  zofran q 6hrs  -nutrition consult: assessment  -dw dr aceves: clear liquid diet   -urine culture pending

## 2019-03-02 NOTE — PROGRESS NOTE ADULT - SUBJECTIVE AND OBJECTIVE BOX
HD#2  pt re admitted for hyperemesis gravidarum w 11lb weight loss  pt seen MFM outpt few days ago  pt states she feels hungry and has hunger HA: dw dr aceves: pt may have clear liquid diet now  pt denies abd pain/vag bleeding/LOF    pt is on iv zofran atc/iv pepcid/iv mvi daily    Vital Signs Last 24 Hrs  T(C): 37.2 (02 Mar 2019 14:28), Max: 37.2 (02 Mar 2019 14:28)  T(F): 99 (02 Mar 2019 14:28), Max: 99 (02 Mar 2019 14:28)  HR: 76 (02 Mar 2019 14:28) (68 - 81)  BP: 102/68 (02 Mar 2019 14:28) (78/46 - 107/70)  BP(mean): 85 (01 Mar 2019 20:38) (85 - 85)  RR: 16 (02 Mar 2019 14:28) (16 - 17)  SpO2: 100% (02 Mar 2019 14:28) (99% - 100%)    skin: dry mucosa noted; pt compared to last admission: noted weight loss  abd soft NT no guarding   bedside sono  adequate amniotic fluid  pelvic: no bleeding no LOF  ext no edema/NT b/l                          12.5   6.68  )-----------( 227      ( 01 Mar 2019 14:16 )             38.1   03-02    139  |  109<H>  |  6<L>  ----------------------------<  77  3.8   |  25  |  0.57    Ca    8.1<L>      02 Mar 2019 06:50    TPro  7.6  /  Alb  3.4<L>  /  TBili  0.6  /  DBili  x   /  AST  27  /  ALT  18  /  AlkPhos  38<L>  03    Urinalysis Basic - ( 01 Mar 2019 14:16 )    Color: Yellow / Appearance: Clear / S.020 / pH: x  Gluc: x / Ketone: Negative  / Bili: Negative / Urobili: 1   Blood: x / Protein: 30 mg/dL / Nitrite: Negative   Leuk Esterase: Moderate / RBC: 5-10 /HPF / WBC 11-25 /HPF   Sq Epi: x / Non Sq Epi: Many /HPF / Bacteria: Many /HPF

## 2019-03-03 LAB
ANION GAP SERPL CALC-SCNC: 6 MMOL/L — SIGNIFICANT CHANGE UP (ref 5–17)
APPEARANCE UR: CLEAR — SIGNIFICANT CHANGE UP
BASOPHILS # BLD AUTO: 0.04 K/UL — SIGNIFICANT CHANGE UP (ref 0–0.2)
BASOPHILS NFR BLD AUTO: 0.8 % — SIGNIFICANT CHANGE UP (ref 0–2)
BILIRUB UR-MCNC: NEGATIVE — SIGNIFICANT CHANGE UP
BUN SERPL-MCNC: 3 MG/DL — LOW (ref 7–18)
CALCIUM SERPL-MCNC: 8.1 MG/DL — LOW (ref 8.4–10.5)
CHLORIDE SERPL-SCNC: 108 MMOL/L — SIGNIFICANT CHANGE UP (ref 96–108)
CO2 SERPL-SCNC: 24 MMOL/L — SIGNIFICANT CHANGE UP (ref 22–31)
COLOR SPEC: YELLOW — SIGNIFICANT CHANGE UP
CREAT SERPL-MCNC: 0.59 MG/DL — SIGNIFICANT CHANGE UP (ref 0.5–1.3)
DIFF PNL FLD: NEGATIVE — SIGNIFICANT CHANGE UP
EOSINOPHIL # BLD AUTO: 0.11 K/UL — SIGNIFICANT CHANGE UP (ref 0–0.5)
EOSINOPHIL NFR BLD AUTO: 2.3 % — SIGNIFICANT CHANGE UP (ref 0–6)
GLUCOSE SERPL-MCNC: 79 MG/DL — SIGNIFICANT CHANGE UP (ref 70–99)
GLUCOSE UR QL: NEGATIVE — SIGNIFICANT CHANGE UP
HCT VFR BLD CALC: 29.2 % — LOW (ref 34.5–45)
HGB BLD-MCNC: 9.7 G/DL — LOW (ref 11.5–15.5)
IMM GRANULOCYTES NFR BLD AUTO: 0.2 % — SIGNIFICANT CHANGE UP (ref 0–1.5)
KETONES UR-MCNC: NEGATIVE — SIGNIFICANT CHANGE UP
LEUKOCYTE ESTERASE UR-ACNC: ABNORMAL
LYMPHOCYTES # BLD AUTO: 1.88 K/UL — SIGNIFICANT CHANGE UP (ref 1–3.3)
LYMPHOCYTES # BLD AUTO: 39.2 % — SIGNIFICANT CHANGE UP (ref 13–44)
MCHC RBC-ENTMCNC: 30.7 PG — SIGNIFICANT CHANGE UP (ref 27–34)
MCHC RBC-ENTMCNC: 33.2 GM/DL — SIGNIFICANT CHANGE UP (ref 32–36)
MCV RBC AUTO: 92.4 FL — SIGNIFICANT CHANGE UP (ref 80–100)
MONOCYTES # BLD AUTO: 0.44 K/UL — SIGNIFICANT CHANGE UP (ref 0–0.9)
MONOCYTES NFR BLD AUTO: 9.2 % — SIGNIFICANT CHANGE UP (ref 2–14)
NEUTROPHILS # BLD AUTO: 2.32 K/UL — SIGNIFICANT CHANGE UP (ref 1.8–7.4)
NEUTROPHILS NFR BLD AUTO: 48.3 % — SIGNIFICANT CHANGE UP (ref 43–77)
NITRITE UR-MCNC: NEGATIVE — SIGNIFICANT CHANGE UP
NRBC # BLD: 0 /100 WBCS — SIGNIFICANT CHANGE UP (ref 0–0)
PH UR: 8 — SIGNIFICANT CHANGE UP (ref 5–8)
PLATELET # BLD AUTO: 153 K/UL — SIGNIFICANT CHANGE UP (ref 150–400)
POTASSIUM SERPL-MCNC: 3.5 MMOL/L — SIGNIFICANT CHANGE UP (ref 3.5–5.3)
POTASSIUM SERPL-SCNC: 3.5 MMOL/L — SIGNIFICANT CHANGE UP (ref 3.5–5.3)
PROT UR-MCNC: NEGATIVE — SIGNIFICANT CHANGE UP
RBC # BLD: 3.16 M/UL — LOW (ref 3.8–5.2)
RBC # FLD: 11.9 % — SIGNIFICANT CHANGE UP (ref 10.3–14.5)
SODIUM SERPL-SCNC: 138 MMOL/L — SIGNIFICANT CHANGE UP (ref 135–145)
SP GR SPEC: 1.01 — SIGNIFICANT CHANGE UP (ref 1.01–1.02)
UROBILINOGEN FLD QL: NEGATIVE — SIGNIFICANT CHANGE UP
WBC # BLD: 4.8 K/UL — SIGNIFICANT CHANGE UP (ref 3.8–10.5)
WBC # FLD AUTO: 4.8 K/UL — SIGNIFICANT CHANGE UP (ref 3.8–10.5)

## 2019-03-03 RX ORDER — FAMOTIDINE 10 MG/ML
20 INJECTION INTRAVENOUS
Qty: 0 | Refills: 0 | Status: DISCONTINUED | OUTPATIENT
Start: 2019-03-03 | End: 2019-03-05

## 2019-03-03 RX ORDER — ONDANSETRON 8 MG/1
4 TABLET, FILM COATED ORAL EVERY 6 HOURS
Qty: 0 | Refills: 0 | Status: DISCONTINUED | OUTPATIENT
Start: 2019-03-03 | End: 2019-03-05

## 2019-03-03 RX ADMIN — SODIUM CHLORIDE 150 MILLILITER(S): 9 INJECTION, SOLUTION INTRAVENOUS at 23:17

## 2019-03-03 RX ADMIN — CEFTRIAXONE 100 GRAM(S): 500 INJECTION, POWDER, FOR SOLUTION INTRAMUSCULAR; INTRAVENOUS at 17:39

## 2019-03-03 RX ADMIN — ONDANSETRON 4 MILLIGRAM(S): 8 TABLET, FILM COATED ORAL at 23:17

## 2019-03-03 RX ADMIN — FAMOTIDINE 20 MILLIGRAM(S): 10 INJECTION INTRAVENOUS at 17:40

## 2019-03-03 RX ADMIN — ONDANSETRON 4 MILLIGRAM(S): 8 TABLET, FILM COATED ORAL at 05:44

## 2019-03-03 RX ADMIN — SODIUM CHLORIDE 150 MILLILITER(S): 9 INJECTION, SOLUTION INTRAVENOUS at 05:43

## 2019-03-03 RX ADMIN — Medication 25 MILLIGRAM(S): at 23:17

## 2019-03-03 RX ADMIN — FAMOTIDINE 20 MILLIGRAM(S): 10 INJECTION INTRAVENOUS at 05:44

## 2019-03-03 RX ADMIN — ONDANSETRON 4 MILLIGRAM(S): 8 TABLET, FILM COATED ORAL at 17:40

## 2019-03-03 RX ADMIN — ONDANSETRON 4 MILLIGRAM(S): 8 TABLET, FILM COATED ORAL at 11:59

## 2019-03-03 NOTE — PROGRESS NOTE ADULT - PROBLEM SELECTOR PLAN 1
-switch IV zofran and pepcid to PO  -advance diet to regular, advised small frequent meals   -f/u UA  -continue ceftriaxone, f/u Ucx   -nutrition consult: assessment  -aimee aceves

## 2019-03-03 NOTE — PROGRESS NOTE ADULT - SUBJECTIVE AND OBJECTIVE BOX
Patient seen at [facility] resting comfortably offers no new complaints. Pt states she has been having some nausea but did not have any vomitting today. + Ambulation, voiding without difficulty, + flatus; tolerating clear liquid diet. Denies HA, CP, SOB, N/V/D,  no bm; dizziness, palpitations, worsening abdominal pain, worsening vaginal bleeding, or any other concerns.     Vital Signs Last 24 Hrs  T(C): 37.2 (03 Mar 2019 13:06), Max: 37.2 (03 Mar 2019 13:06)  T(F): 98.9 (03 Mar 2019 13:06), Max: 98.9 (03 Mar 2019 13:06)  HR: 64 (03 Mar 2019 13:06) (64 - 66)  BP: 90/56 (03 Mar 2019 13:06) (79/43 - 90/56)  BP(mean): 51 (02 Mar 2019 22:19) (51 - 51)  RR: 16 (03 Mar 2019 13:06) (16 - 16)  SpO2: 100% (03 Mar 2019 13:06) (100% - 100%)    Gen: A&O x 3, NAD  Chest: CTA B/L  Cardiac: S1,S2  RRR  Breast: Soft, nontender, nonengorged  Abdomen: +BS; soft; Nontender, nondistended  Gyn: no vaginal bleeding   Extremities: Nontender, no worsening edema                          9.7    4.80  )-----------( 153      ( 03 Mar 2019 06:47 )             29.2     03-03    138  |  108  |  3<L>  ----------------------------<  79  3.5   |  24  |  0.59    Ca    8.1<L>      03 Mar 2019 06:47      28 year old HD#3 pt re admitted for hyperemesis gravidarum   -switch IV zofran and pepcid to PO  -advance diet to regular, advised small frequent meals   -f/u UA  -continue ceftriaxone, f/u Ucx   -nutrition consult: assessment  -dw dr aceves

## 2019-03-04 ENCOUNTER — TRANSCRIPTION ENCOUNTER (OUTPATIENT)
Age: 29
End: 2019-03-04

## 2019-03-04 RX ORDER — SIMETHICONE 80 MG/1
80 TABLET, CHEWABLE ORAL EVERY 6 HOURS
Qty: 0 | Refills: 0 | Status: DISCONTINUED | OUTPATIENT
Start: 2019-03-04 | End: 2019-03-05

## 2019-03-04 RX ORDER — MAGNESIUM HYDROXIDE 400 MG/1
30 TABLET, CHEWABLE ORAL DAILY
Qty: 0 | Refills: 0 | Status: DISCONTINUED | OUTPATIENT
Start: 2019-03-04 | End: 2019-03-05

## 2019-03-04 RX ADMIN — FAMOTIDINE 20 MILLIGRAM(S): 10 INJECTION INTRAVENOUS at 05:45

## 2019-03-04 RX ADMIN — SODIUM CHLORIDE 150 MILLILITER(S): 9 INJECTION, SOLUTION INTRAVENOUS at 03:34

## 2019-03-04 RX ADMIN — FAMOTIDINE 20 MILLIGRAM(S): 10 INJECTION INTRAVENOUS at 17:10

## 2019-03-04 RX ADMIN — Medication 25 MILLIGRAM(S): at 02:17

## 2019-03-04 RX ADMIN — Medication 25 MILLIGRAM(S): at 13:52

## 2019-03-04 RX ADMIN — Medication 25 MILLIGRAM(S): at 17:10

## 2019-03-04 RX ADMIN — ONDANSETRON 4 MILLIGRAM(S): 8 TABLET, FILM COATED ORAL at 13:48

## 2019-03-04 RX ADMIN — ONDANSETRON 4 MILLIGRAM(S): 8 TABLET, FILM COATED ORAL at 05:45

## 2019-03-04 RX ADMIN — Medication 25 MILLIGRAM(S): at 05:45

## 2019-03-04 RX ADMIN — ONDANSETRON 4 MILLIGRAM(S): 8 TABLET, FILM COATED ORAL at 17:11

## 2019-03-04 NOTE — DISCHARGE NOTE NURSING/CASE MANAGEMENT/SOCIAL WORK - NSDCDPATPORTLINK_GEN_ALL_CORE
You can access the [facility] Patient Portal, offered by [facility], by registering with the following website: [email or web address]

## 2019-03-04 NOTE — PROGRESS NOTE ADULT - SUBJECTIVE AND OBJECTIVE BOX
Patient seen resting comfortably.  No current complaints.  States ate dinner last night, and vomited approx two hours afterwards.  Has some nausea, but is not compliant with phenergan, states that she "will take it after showering and breakfast.  Discussed efficacy of medications and needing to be on schedule with meds.   Denies HA, CP, SOB, dizziness, palpitations, worsening abdominal pain, worsening vaginal bleeding or any other concerns.  + Ambulation, + void without difficulty, + flatus and tolerating regular diet.     Vital Signs Last 24 Hrs  T(C): 37.1 (04 Mar 2019 06:20), Max: 37.2 (03 Mar 2019 13:06)  T(F): 98.7 (04 Mar 2019 06:20), Max: 98.9 (03 Mar 2019 13:06)  HR: 61 (04 Mar 2019 06:20) (61 - 85)  BP: 89/49 (04 Mar 2019 06:20) (80/48 - 90/56)  RR: 16 (04 Mar 2019 06:20) (16 - 17)  SpO2: 98% (04 Mar 2019 06:20) (98% - 100%)    Gen: A&O x3, NAD  Chest: CTABL  Cardiac: S1+S2+ RRR  Abdomen: Soft, nontender, +BS, nondistended, gravid uterus   Gyn: No active bleeding  Extremities: Nontender, no worsening edema, DTRS 2+, venodynes intact bilaterally                          9.7    4.80  )-----------( 153      ( 03 Mar 2019 06:47 )             29.2    03-03    138  |  108  |  3<L>  ----------------------------<  79  3.5   |  24  |  0.59    Ca    8.1<L>      03 Mar 2019 06:47        A/P:  28 year old  at 14wks with hyperemesis gravidarum.  Continued nausea.      - PO zofran, phenergan switched to PO from [place] as patient is non compliant  - Patient encouraged to ambulate  - Advance diet slowly  -D/C Rocephin as urine culture negative     Patient seen by Dr Young  -

## 2019-03-04 NOTE — DIETITIAN INITIAL EVALUATION ADULT. - OTHER INFO
11# weight loss/ 3 months with reduced PO intake. Pt takes some Ensure at home but vomits. Willing to try Ensure clears. No food requests. Diet copies provided (Healthy pregnancy and Morning Sickness Tips.

## 2019-03-04 NOTE — CHART NOTE - NSCHARTNOTEFT_GEN_A_CORE
Upon Nutritional Assessment by the Registered Dietitian your patient was determined to meet criteria / has evidence of the following diagnosis/diagnoses:          [ ]  Mild Protein Calorie Malnutrition        x[ ]  Moderate Protein Calorie Malnutrition        [ ] Severe Protein Calorie Malnutrition        [ ] Unspecified Protein Calorie Malnutrition        [ ] Underweight / BMI <19        [ ] Morbid Obesity / BMI > 40      Findings as based on:  •  Comprehensive nutrition assessment and consultation  •  Calorie counts (nutrient intake analysis)  •  Food acceptance and intake status from observations by staff  •  Follow up  •  Patient education  •  Intervention secondary to interdisciplinary rounds  •   concerns      Treatment:    The following diet has been recommended:  Add Ensure clears TID. Diet copies provided.    PROVIDER Section:     By signing this assessment you are acknowledging and agree with the diagnosis/diagnoses assigned by the Registered Dietitian    Comments:

## 2019-03-04 NOTE — DIETITIAN INITIAL EVALUATION ADULT. - PERTINENT LABORATORY DATA
03-03 Na138 mmol/L Glu 79 mg/dL K+ 3.5 mmol/L Cr  0.59 mg/dL BUN 3 mg/dL<L> 03-01 Alb 3.4 g/dL<L> 02-16 PAB 16 mg/dL<L> 02-16 Chol --    LDL --    HDL --    Trig 60 mg/dL

## 2019-03-04 NOTE — DIETITIAN INITIAL EVALUATION ADULT. - NUTRITION INTERVENTION
Medical Food Supplements/Vitamin/Nutrition - Related Medication Management/Collaboration and Referral of Nutrition Care/Meals and Snack

## 2019-03-05 ENCOUNTER — TRANSCRIPTION ENCOUNTER (OUTPATIENT)
Age: 29
End: 2019-03-05

## 2019-03-05 VITALS
RESPIRATION RATE: 18 BRPM | HEART RATE: 65 BPM | TEMPERATURE: 98 F | OXYGEN SATURATION: 100 % | DIASTOLIC BLOOD PRESSURE: 52 MMHG | SYSTOLIC BLOOD PRESSURE: 99 MMHG

## 2019-03-05 PROCEDURE — 99285 EMERGENCY DEPT VISIT HI MDM: CPT | Mod: 25

## 2019-03-05 PROCEDURE — 96374 THER/PROPH/DIAG INJ IV PUSH: CPT

## 2019-03-05 PROCEDURE — 84702 CHORIONIC GONADOTROPIN TEST: CPT

## 2019-03-05 PROCEDURE — 81001 URINALYSIS AUTO W/SCOPE: CPT

## 2019-03-05 PROCEDURE — 82009 KETONE BODYS QUAL: CPT

## 2019-03-05 PROCEDURE — 36415 COLL VENOUS BLD VENIPUNCTURE: CPT

## 2019-03-05 PROCEDURE — 96375 TX/PRO/DX INJ NEW DRUG ADDON: CPT

## 2019-03-05 PROCEDURE — 87086 URINE CULTURE/COLONY COUNT: CPT

## 2019-03-05 PROCEDURE — 85027 COMPLETE CBC AUTOMATED: CPT

## 2019-03-05 PROCEDURE — 80048 BASIC METABOLIC PNL TOTAL CA: CPT

## 2019-03-05 PROCEDURE — 80053 COMPREHEN METABOLIC PANEL: CPT

## 2019-03-05 RX ORDER — ONDANSETRON 8 MG/1
1 TABLET, FILM COATED ORAL
Qty: 90 | Refills: 0 | OUTPATIENT
Start: 2019-03-05 | End: 2019-04-03

## 2019-03-05 RX ADMIN — FAMOTIDINE 20 MILLIGRAM(S): 10 INJECTION INTRAVENOUS at 06:13

## 2019-03-05 RX ADMIN — ONDANSETRON 4 MILLIGRAM(S): 8 TABLET, FILM COATED ORAL at 06:13

## 2019-03-05 RX ADMIN — Medication 25 MILLIGRAM(S): at 06:13

## 2019-03-05 RX ADMIN — Medication 25 MILLIGRAM(S): at 13:47

## 2019-03-05 RX ADMIN — Medication 25 MILLIGRAM(S): at 00:03

## 2019-03-05 RX ADMIN — SODIUM CHLORIDE 150 MILLILITER(S): 9 INJECTION, SOLUTION INTRAVENOUS at 06:14

## 2019-03-05 RX ADMIN — ONDANSETRON 4 MILLIGRAM(S): 8 TABLET, FILM COATED ORAL at 13:47

## 2019-03-05 RX ADMIN — ONDANSETRON 4 MILLIGRAM(S): 8 TABLET, FILM COATED ORAL at 00:03

## 2019-03-05 NOTE — PROGRESS NOTE ADULT - ASSESSMENT
A/P:  28 year old  at 14wks with hyperemesis gravidarum.   feeeling better.  - d/c this am  -nutrition consult done  - Home to be set up by Dr Young office  -d/w Dr Young

## 2019-03-05 NOTE — DISCHARGE NOTE PROVIDER - HOSPITAL COURSE
28y F @ [facility] 14 weeks, edc 8/31/2019 readmitted  through ED to [facility] for hyperemesis gravidarium.  IV antibiotics given for     supsected uti, Nutrition consult done. Compliance with medications stressed. Patient discharged home in stable condition

## 2019-03-05 NOTE — DISCHARGE NOTE PROVIDER - NSDCCPCAREPLAN_GEN_ALL_CORE_FT
PRINCIPAL DISCHARGE DIAGNOSIS  Problem: Hyperemesis gravidarum  Assessment and Plan of Treatment:       SECONDARY DISCHARGE DIAGNOSES  Problem: Urinary tract infection in mother during second trimester of pregnancy  Assessment and Plan of Treatment:

## 2019-03-05 NOTE — DISCHARGE NOTE PROVIDER - CARE PROVIDER_API CALL
Zuleyma Young)  Obstetrics and Gynecology  [facility] [address] [place] [address], [place] [address]  Phone: [phone]  Fax: [phone]  Follow Up Time:

## 2019-04-12 ENCOUNTER — INPATIENT (INPATIENT)
Facility: HOSPITAL | Age: 29
LOS: 2 days | Discharge: HOME | DRG: 831 | End: 2019-04-15
Attending: OBSTETRICS & GYNECOLOGY | Admitting: OBSTETRICS & GYNECOLOGY
Payer: COMMERCIAL

## 2019-04-12 VITALS
HEIGHT: 62 IN | HEART RATE: 106 BPM | TEMPERATURE: 98 F | RESPIRATION RATE: 16 BRPM | SYSTOLIC BLOOD PRESSURE: 90 MMHG | DIASTOLIC BLOOD PRESSURE: 60 MMHG | OXYGEN SATURATION: 99 % | WEIGHT: 106.92 LBS

## 2019-04-12 DIAGNOSIS — Z98.890 OTHER SPECIFIED POSTPROCEDURAL STATES: Chronic | ICD-10-CM

## 2019-04-12 DIAGNOSIS — O99.012 ANEMIA COMPLICATING PREGNANCY, SECOND TRIMESTER: ICD-10-CM

## 2019-04-12 DIAGNOSIS — R11.10 VOMITING, UNSPECIFIED: ICD-10-CM

## 2019-04-12 PROBLEM — N80.9 ENDOMETRIOSIS, UNSPECIFIED: Chronic | Status: ACTIVE | Noted: 2019-03-01

## 2019-04-12 LAB
ACETONE SERPL-MCNC: NEGATIVE — SIGNIFICANT CHANGE UP
ALBUMIN SERPL ELPH-MCNC: 3.2 G/DL — LOW (ref 3.5–5)
ALP SERPL-CCNC: 60 U/L — SIGNIFICANT CHANGE UP (ref 40–120)
ALT FLD-CCNC: 27 U/L DA — SIGNIFICANT CHANGE UP (ref 10–60)
ANION GAP SERPL CALC-SCNC: 7 MMOL/L — SIGNIFICANT CHANGE UP (ref 5–17)
APPEARANCE UR: CLEAR — SIGNIFICANT CHANGE UP
AST SERPL-CCNC: 13 U/L — SIGNIFICANT CHANGE UP (ref 10–40)
BASOPHILS # BLD AUTO: 0.04 K/UL — SIGNIFICANT CHANGE UP (ref 0–0.2)
BASOPHILS NFR BLD AUTO: 0.5 % — SIGNIFICANT CHANGE UP (ref 0–2)
BILIRUB SERPL-MCNC: 0.5 MG/DL — SIGNIFICANT CHANGE UP (ref 0.2–1.2)
BILIRUB UR-MCNC: ABNORMAL
BUN SERPL-MCNC: 13 MG/DL — SIGNIFICANT CHANGE UP (ref 7–18)
CALCIUM SERPL-MCNC: 8.6 MG/DL — SIGNIFICANT CHANGE UP (ref 8.4–10.5)
CHLORIDE SERPL-SCNC: 105 MMOL/L — SIGNIFICANT CHANGE UP (ref 96–108)
CO2 SERPL-SCNC: 25 MMOL/L — SIGNIFICANT CHANGE UP (ref 22–31)
COLOR SPEC: YELLOW — SIGNIFICANT CHANGE UP
CREAT SERPL-MCNC: 0.64 MG/DL — SIGNIFICANT CHANGE UP (ref 0.5–1.3)
DIFF PNL FLD: ABNORMAL
EOSINOPHIL # BLD AUTO: 0.1 K/UL — SIGNIFICANT CHANGE UP (ref 0–0.5)
EOSINOPHIL NFR BLD AUTO: 1.2 % — SIGNIFICANT CHANGE UP (ref 0–6)
GLUCOSE SERPL-MCNC: 86 MG/DL — SIGNIFICANT CHANGE UP (ref 70–99)
GLUCOSE UR QL: NEGATIVE — SIGNIFICANT CHANGE UP
HCG SERPL-ACNC: HIGH MIU/ML
HCT VFR BLD CALC: 32.2 % — LOW (ref 34.5–45)
HGB BLD-MCNC: 10.6 G/DL — LOW (ref 11.5–15.5)
IMM GRANULOCYTES NFR BLD AUTO: 1.6 % — HIGH (ref 0–1.5)
KETONES UR-MCNC: ABNORMAL
LEUKOCYTE ESTERASE UR-ACNC: ABNORMAL
LYMPHOCYTES # BLD AUTO: 1.99 K/UL — SIGNIFICANT CHANGE UP (ref 1–3.3)
LYMPHOCYTES # BLD AUTO: 23.2 % — SIGNIFICANT CHANGE UP (ref 13–44)
MCHC RBC-ENTMCNC: 30.9 PG — SIGNIFICANT CHANGE UP (ref 27–34)
MCHC RBC-ENTMCNC: 32.9 GM/DL — SIGNIFICANT CHANGE UP (ref 32–36)
MCV RBC AUTO: 93.9 FL — SIGNIFICANT CHANGE UP (ref 80–100)
MONOCYTES # BLD AUTO: 0.47 K/UL — SIGNIFICANT CHANGE UP (ref 0–0.9)
MONOCYTES NFR BLD AUTO: 5.5 % — SIGNIFICANT CHANGE UP (ref 2–14)
NEUTROPHILS # BLD AUTO: 5.84 K/UL — SIGNIFICANT CHANGE UP (ref 1.8–7.4)
NEUTROPHILS NFR BLD AUTO: 68 % — SIGNIFICANT CHANGE UP (ref 43–77)
NITRITE UR-MCNC: NEGATIVE — SIGNIFICANT CHANGE UP
NRBC # BLD: 0 /100 WBCS — SIGNIFICANT CHANGE UP (ref 0–0)
PH UR: 6 — SIGNIFICANT CHANGE UP (ref 5–8)
PLATELET # BLD AUTO: 232 K/UL — SIGNIFICANT CHANGE UP (ref 150–400)
POTASSIUM SERPL-MCNC: 3.6 MMOL/L — SIGNIFICANT CHANGE UP (ref 3.5–5.3)
POTASSIUM SERPL-SCNC: 3.6 MMOL/L — SIGNIFICANT CHANGE UP (ref 3.5–5.3)
PROT SERPL-MCNC: 7.5 G/DL — SIGNIFICANT CHANGE UP (ref 6–8.3)
PROT UR-MCNC: 30 MG/DL
RBC # BLD: 3.43 M/UL — LOW (ref 3.8–5.2)
RBC # FLD: 13.3 % — SIGNIFICANT CHANGE UP (ref 10.3–14.5)
SODIUM SERPL-SCNC: 137 MMOL/L — SIGNIFICANT CHANGE UP (ref 135–145)
SP GR SPEC: 1.02 — SIGNIFICANT CHANGE UP (ref 1.01–1.02)
UROBILINOGEN FLD QL: 1
WBC # BLD: 8.58 K/UL — SIGNIFICANT CHANGE UP (ref 3.8–10.5)
WBC # FLD AUTO: 8.58 K/UL — SIGNIFICANT CHANGE UP (ref 3.8–10.5)

## 2019-04-12 PROCEDURE — 99283 EMERGENCY DEPT VISIT LOW MDM: CPT

## 2019-04-12 PROCEDURE — 76817 TRANSVAGINAL US OBSTETRIC: CPT | Mod: 26

## 2019-04-12 PROCEDURE — 76805 OB US >/= 14 WKS SNGL FETUS: CPT | Mod: 26

## 2019-04-12 RX ORDER — FAMOTIDINE 10 MG/ML
20 INJECTION INTRAVENOUS
Qty: 0 | Refills: 0 | Status: DISCONTINUED | OUTPATIENT
Start: 2019-04-12 | End: 2019-04-14

## 2019-04-12 RX ORDER — ONDANSETRON 8 MG/1
4 TABLET, FILM COATED ORAL EVERY 6 HOURS
Qty: 0 | Refills: 0 | Status: DISCONTINUED | OUTPATIENT
Start: 2019-04-12 | End: 2019-04-15

## 2019-04-12 RX ORDER — IRON SUCROSE 20 MG/ML
200 INJECTION, SOLUTION INTRAVENOUS EVERY 24 HOURS
Qty: 0 | Refills: 0 | Status: DISCONTINUED | OUTPATIENT
Start: 2019-04-12 | End: 2019-04-15

## 2019-04-12 RX ORDER — IRON SUCROSE 20 MG/ML
200 INJECTION, SOLUTION INTRAVENOUS ONCE
Qty: 0 | Refills: 0 | Status: COMPLETED | OUTPATIENT
Start: 2019-04-12 | End: 2019-04-12

## 2019-04-12 RX ORDER — PYRIDOXINE HCL (VITAMIN B6) 100 MG
25 TABLET ORAL EVERY 8 HOURS
Qty: 0 | Refills: 0 | Status: DISCONTINUED | OUTPATIENT
Start: 2019-04-12 | End: 2019-04-15

## 2019-04-12 RX ORDER — SODIUM CHLORIDE 9 MG/ML
1000 INJECTION, SOLUTION INTRAVENOUS
Qty: 0 | Refills: 0 | Status: DISCONTINUED | OUTPATIENT
Start: 2019-04-12 | End: 2019-04-15

## 2019-04-12 RX ORDER — ONDANSETRON 8 MG/1
4 TABLET, FILM COATED ORAL ONCE
Qty: 0 | Refills: 0 | Status: COMPLETED | OUTPATIENT
Start: 2019-04-12 | End: 2019-04-12

## 2019-04-12 RX ORDER — SODIUM CHLORIDE 9 MG/ML
1000 INJECTION INTRAMUSCULAR; INTRAVENOUS; SUBCUTANEOUS ONCE
Qty: 0 | Refills: 0 | Status: COMPLETED | OUTPATIENT
Start: 2019-04-12 | End: 2019-04-12

## 2019-04-12 RX ORDER — DIPHENHYDRAMINE HCL 50 MG
50 CAPSULE ORAL ONCE
Qty: 0 | Refills: 0 | Status: DISCONTINUED | OUTPATIENT
Start: 2019-04-12 | End: 2019-04-15

## 2019-04-12 RX ADMIN — Medication 25 MILLIGRAM(S): at 23:45

## 2019-04-12 RX ADMIN — IRON SUCROSE 110 MILLIGRAM(S): 20 INJECTION, SOLUTION INTRAVENOUS at 13:42

## 2019-04-12 RX ADMIN — SODIUM CHLORIDE 125 MILLILITER(S): 9 INJECTION, SOLUTION INTRAVENOUS at 22:20

## 2019-04-12 RX ADMIN — ONDANSETRON 4 MILLIGRAM(S): 8 TABLET, FILM COATED ORAL at 10:49

## 2019-04-12 RX ADMIN — SODIUM CHLORIDE 1000 MILLILITER(S): 9 INJECTION INTRAMUSCULAR; INTRAVENOUS; SUBCUTANEOUS at 10:48

## 2019-04-12 RX ADMIN — FAMOTIDINE 20 MILLIGRAM(S): 10 INJECTION INTRAVENOUS at 17:26

## 2019-04-12 RX ADMIN — Medication 25 MILLIGRAM(S): at 13:29

## 2019-04-12 RX ADMIN — SODIUM CHLORIDE 125 MILLILITER(S): 9 INJECTION, SOLUTION INTRAVENOUS at 13:41

## 2019-04-12 RX ADMIN — FAMOTIDINE 20 MILLIGRAM(S): 10 INJECTION INTRAVENOUS at 13:29

## 2019-04-12 NOTE — H&P ADULT - NSHPLABSRESULTS_GEN_ALL_CORE
10.6   8.58  )-----------( 232      ( 12 Apr 2019 10:51 )             32.2    04-12    137  |  105  |  13  ----------------------------<  86  3.6   |  25  |  0.64    Ca    8.6      12 Apr 2019 10:51    TPro  7.5  /  Alb  3.2<L>  /  TBili  0.5  /  DBili  x   /  AST  13  /  ALT  27  /  AlkPhos  60  04-12

## 2019-04-12 NOTE — H&P ADULT - HISTORY OF PRESENT ILLNESS
28 year old female,  presents with nausea and vomiting. She is currently 17 weeks pregnant with LMP 2018. Pt has had multple admissions with current pregnancy for hyperemesis. Pt states she was improved with IV zofran pump at home but symptoms have been worsening over the past few days. Pt has been having nausea and vomitting 2-5 times a day and is unable to keep any thing down. Pt states she has 10 pound weight loss since the start of the pregnancy. Pt also states she has anemia in pregnancy , recommended for IV iron. She denies the following: fever, chills. abdominal pain. flank pain, dysuria, urgency, frequency, pelvic pain, vaginal bleeding. She denies any recent travel.  POb/gynHx: TOP w/o d&c x 1, hx of endometriosis with endometriosis s/p b/l ovarian cystectomy; denies fibroids, stds, last pap wnl, sexually active with 1 partner   Allergies: sulfa (hives), morphine (anaphylaxis)  Meds: PNV, IV Zofran pump at home   PMHx: denies  PSHx: lap ovarian cystectomy b/l   PsocHx: denies x 3

## 2019-04-12 NOTE — ED PROVIDER NOTE - OBJECTIVE STATEMENT
27 y/o F with PMHx of endometriosis and PSHx of ovarian cystectomy presents to the ED at 19 weeks EGA with complaints of hyperemesis. Patient currently takes Zofran, however still vomiting 6-10x daily. Patient states she saw GYN who was referred to the ED for admission. As per GYN, patient possibly in need of transfusion. Denies any other acute complaints.

## 2019-04-12 NOTE — H&P ADULT - ASSESSMENT
28 year old female,  17 weeks pregnant with hyperemesis gravidarum and anemia in pregnancy  -admit to GYN   -keep NPO   -IV venofer   -start zofran atc Phenergan atc, Pepcid atc, Pyridoxine   -banana bag   -fetal sono   -cbc/cmp/acetone/prealbumin/triglyceride in AM  -UA in AM  -Nutrition consult   -case d/w Dr. Young

## 2019-04-12 NOTE — H&P ADULT - PROBLEM SELECTOR PLAN 2
-admit to GYN   -keep NPO   -IV venofer   -start zofran atc Phenergan atc, Pepcid atc, Pyridoxine   -banana bag   -fetal sono   -cbc/cmp/acetone/prealbumin/triglyceride in AM  -UA in AM  -Nutrition consult   -case d/w Dr. Young

## 2019-04-13 LAB
ACETONE SERPL-MCNC: NEGATIVE — SIGNIFICANT CHANGE UP
ALBUMIN SERPL ELPH-MCNC: 2.3 G/DL — LOW (ref 3.5–5)
ALP SERPL-CCNC: 43 U/L — SIGNIFICANT CHANGE UP (ref 40–120)
ALT FLD-CCNC: 18 U/L DA — SIGNIFICANT CHANGE UP (ref 10–60)
ANION GAP SERPL CALC-SCNC: 5 MMOL/L — SIGNIFICANT CHANGE UP (ref 5–17)
AST SERPL-CCNC: 14 U/L — SIGNIFICANT CHANGE UP (ref 10–40)
BASOPHILS # BLD AUTO: 0.03 K/UL — SIGNIFICANT CHANGE UP (ref 0–0.2)
BASOPHILS # BLD AUTO: 0.04 K/UL — SIGNIFICANT CHANGE UP (ref 0–0.2)
BASOPHILS NFR BLD AUTO: 0.5 % — SIGNIFICANT CHANGE UP (ref 0–2)
BASOPHILS NFR BLD AUTO: 0.6 % — SIGNIFICANT CHANGE UP (ref 0–2)
BILIRUB SERPL-MCNC: 0.6 MG/DL — SIGNIFICANT CHANGE UP (ref 0.2–1.2)
BUN SERPL-MCNC: 5 MG/DL — LOW (ref 7–18)
CALCIUM SERPL-MCNC: 7.9 MG/DL — LOW (ref 8.4–10.5)
CHLORIDE SERPL-SCNC: 108 MMOL/L — SIGNIFICANT CHANGE UP (ref 96–108)
CO2 SERPL-SCNC: 26 MMOL/L — SIGNIFICANT CHANGE UP (ref 22–31)
CREAT SERPL-MCNC: 0.53 MG/DL — SIGNIFICANT CHANGE UP (ref 0.5–1.3)
EOSINOPHIL # BLD AUTO: 0.16 K/UL — SIGNIFICANT CHANGE UP (ref 0–0.5)
EOSINOPHIL # BLD AUTO: 0.23 K/UL — SIGNIFICANT CHANGE UP (ref 0–0.5)
EOSINOPHIL NFR BLD AUTO: 2.2 % — SIGNIFICANT CHANGE UP (ref 0–6)
EOSINOPHIL NFR BLD AUTO: 3.5 % — SIGNIFICANT CHANGE UP (ref 0–6)
GLUCOSE SERPL-MCNC: 75 MG/DL — SIGNIFICANT CHANGE UP (ref 70–99)
HCT VFR BLD CALC: 24.2 % — LOW (ref 34.5–45)
HCT VFR BLD CALC: 24.5 % — LOW (ref 34.5–45)
HGB BLD-MCNC: 7.9 G/DL — LOW (ref 11.5–15.5)
HGB BLD-MCNC: 8 G/DL — LOW (ref 11.5–15.5)
IMM GRANULOCYTES NFR BLD AUTO: 0.8 % — SIGNIFICANT CHANGE UP (ref 0–1.5)
IMM GRANULOCYTES NFR BLD AUTO: 0.9 % — SIGNIFICANT CHANGE UP (ref 0–1.5)
LYMPHOCYTES # BLD AUTO: 1.74 K/UL — SIGNIFICANT CHANGE UP (ref 1–3.3)
LYMPHOCYTES # BLD AUTO: 2.14 K/UL — SIGNIFICANT CHANGE UP (ref 1–3.3)
LYMPHOCYTES # BLD AUTO: 24.2 % — SIGNIFICANT CHANGE UP (ref 13–44)
LYMPHOCYTES # BLD AUTO: 32.2 % — SIGNIFICANT CHANGE UP (ref 13–44)
MCHC RBC-ENTMCNC: 30.7 PG — SIGNIFICANT CHANGE UP (ref 27–34)
MCHC RBC-ENTMCNC: 31 PG — SIGNIFICANT CHANGE UP (ref 27–34)
MCHC RBC-ENTMCNC: 32.6 GM/DL — SIGNIFICANT CHANGE UP (ref 32–36)
MCHC RBC-ENTMCNC: 32.7 GM/DL — SIGNIFICANT CHANGE UP (ref 32–36)
MCV RBC AUTO: 94.2 FL — SIGNIFICANT CHANGE UP (ref 80–100)
MCV RBC AUTO: 95 FL — SIGNIFICANT CHANGE UP (ref 80–100)
MONOCYTES # BLD AUTO: 0.4 K/UL — SIGNIFICANT CHANGE UP (ref 0–0.9)
MONOCYTES # BLD AUTO: 0.4 K/UL — SIGNIFICANT CHANGE UP (ref 0–0.9)
MONOCYTES NFR BLD AUTO: 5.6 % — SIGNIFICANT CHANGE UP (ref 2–14)
MONOCYTES NFR BLD AUTO: 6 % — SIGNIFICANT CHANGE UP (ref 2–14)
NEUTROPHILS # BLD AUTO: 3.79 K/UL — SIGNIFICANT CHANGE UP (ref 1.8–7.4)
NEUTROPHILS # BLD AUTO: 4.78 K/UL — SIGNIFICANT CHANGE UP (ref 1.8–7.4)
NEUTROPHILS NFR BLD AUTO: 56.9 % — SIGNIFICANT CHANGE UP (ref 43–77)
NEUTROPHILS NFR BLD AUTO: 66.6 % — SIGNIFICANT CHANGE UP (ref 43–77)
NRBC # BLD: 0 /100 WBCS — SIGNIFICANT CHANGE UP (ref 0–0)
NRBC # BLD: 0 /100 WBCS — SIGNIFICANT CHANGE UP (ref 0–0)
PLATELET # BLD AUTO: 187 K/UL — SIGNIFICANT CHANGE UP (ref 150–400)
PLATELET # BLD AUTO: 190 K/UL — SIGNIFICANT CHANGE UP (ref 150–400)
POTASSIUM SERPL-MCNC: 3.5 MMOL/L — SIGNIFICANT CHANGE UP (ref 3.5–5.3)
POTASSIUM SERPL-SCNC: 3.5 MMOL/L — SIGNIFICANT CHANGE UP (ref 3.5–5.3)
PREALB SERPL-MCNC: 12 MG/DL — LOW (ref 20–40)
PROT SERPL-MCNC: 5.2 G/DL — LOW (ref 6–8.3)
RBC # BLD: 2.57 M/UL — LOW (ref 3.8–5.2)
RBC # BLD: 2.58 M/UL — LOW (ref 3.8–5.2)
RBC # FLD: 13.3 % — SIGNIFICANT CHANGE UP (ref 10.3–14.5)
RBC # FLD: 13.3 % — SIGNIFICANT CHANGE UP (ref 10.3–14.5)
SODIUM SERPL-SCNC: 139 MMOL/L — SIGNIFICANT CHANGE UP (ref 135–145)
TRIGL SERPL-MCNC: 128 MG/DL — SIGNIFICANT CHANGE UP (ref 10–149)
WBC # BLD: 6.65 K/UL — SIGNIFICANT CHANGE UP (ref 3.8–10.5)
WBC # BLD: 7.18 K/UL — SIGNIFICANT CHANGE UP (ref 3.8–10.5)
WBC # FLD AUTO: 6.65 K/UL — SIGNIFICANT CHANGE UP (ref 3.8–10.5)
WBC # FLD AUTO: 7.18 K/UL — SIGNIFICANT CHANGE UP (ref 3.8–10.5)

## 2019-04-13 RX ADMIN — IRON SUCROSE 110 MILLIGRAM(S): 20 INJECTION, SOLUTION INTRAVENOUS at 17:12

## 2019-04-13 RX ADMIN — ONDANSETRON 4 MILLIGRAM(S): 8 TABLET, FILM COATED ORAL at 21:55

## 2019-04-13 RX ADMIN — Medication 25 MILLIGRAM(S): at 11:21

## 2019-04-13 RX ADMIN — Medication 25 MILLIGRAM(S): at 21:54

## 2019-04-13 RX ADMIN — FAMOTIDINE 20 MILLIGRAM(S): 10 INJECTION INTRAVENOUS at 17:12

## 2019-04-13 RX ADMIN — Medication 25 MILLIGRAM(S): at 17:12

## 2019-04-13 RX ADMIN — Medication 25 MILLIGRAM(S): at 05:44

## 2019-04-13 RX ADMIN — FAMOTIDINE 20 MILLIGRAM(S): 10 INJECTION INTRAVENOUS at 05:45

## 2019-04-13 RX ADMIN — Medication 25 MILLIGRAM(S): at 05:45

## 2019-04-13 RX ADMIN — SODIUM CHLORIDE 125 MILLILITER(S): 9 INJECTION, SOLUTION INTRAVENOUS at 05:45

## 2019-04-13 RX ADMIN — SODIUM CHLORIDE 125 MILLILITER(S): 9 INJECTION, SOLUTION INTRAVENOUS at 18:42

## 2019-04-13 NOTE — DIETITIAN INITIAL EVALUATION ADULT. - ORAL INTAKE PTA
x 3 m with intermittent nausea/vomiting; recent admissions with Moderate Malnutrition identified 2/17/19, 3/4/19/poor

## 2019-04-13 NOTE — DIETITIAN INITIAL EVALUATION ADULT. - MD RECOMMEND
Advance diet or consider nutritional supplement if Clear liquid diet prolonged as medically feasible; Add Ensure Clears 1can tid (600kcal, 21g protein)

## 2019-04-13 NOTE — DIETITIAN INITIAL EVALUATION ADULT. - SIGNS/SYMPTOMS
poor intake, no expected wt gain for repugnance,  wt loss 9.4 % x 3 to 4m, recent Dx of Malnutrition

## 2019-04-13 NOTE — DIETITIAN INITIAL EVALUATION ADULT. - NS AS NUTRI INTERV MEALS SNACK
Advance diet or consider nutritional supplement if Clear liquid diet prolonged as medically feasible/General/healthful diet

## 2019-04-13 NOTE — DIETITIAN INITIAL EVALUATION ADULT. - PROBLEM SELECTOR PLAN 1
-admit to GYN   -keep NPO   -IV venofer   -start zofran atc Phenergan atc, Pepcid atc, Pyridoxine   -banana bag   -fetal sono   -cbc/cmp/acetone/prealbumin/triglyceride in AM  -UA in AM  -Nutrition consult   -case d/w Dr. Young per MD

## 2019-04-13 NOTE — DIETITIAN INITIAL EVALUATION ADULT. - FACTORS AFF FOOD INTAKE
Hindu/ethnic/cultural/personal food preferences/hyperemesis, nausea/vomiting hyperemesis, nausea/vomiting

## 2019-04-13 NOTE — DIETITIAN INITIAL EVALUATION ADULT. - PERTINENT MEDS FT
reviewed  MEDICATIONS  (STANDING):  diphenhydrAMINE   Injectable 50 milliGRAM(s) IV Push once  famotidine Injectable 20 milliGRAM(s) IV Push two times a day  iron sucrose IVPB 200 milliGRAM(s) IV Intermittent every 24 hours  lactated ringers 1000 milliLiter(s) (125 mL/Hr) IV Continuous <Continuous>  lactated ringers. 1000 milliLiter(s) (125 mL/Hr) IV Continuous <Continuous>  promethazine 25 milliGRAM(s) Oral every 6 hours  pyridoxine 25 milliGRAM(s) Oral every 8 hours    MEDICATIONS  (PRN):  ondansetron Injectable 4 milliGRAM(s) IV Push every 6 hours PRN Nausea and/or Vomiting

## 2019-04-13 NOTE — PROGRESS NOTE ADULT - PROBLEM SELECTOR PLAN 1
- Continue IV venofer for anemia  - Zofran, Phenergan, Pepcid, Pyridoxine   - Advance to clears  - Case management notified about possible IV fluids and IV meds at home, states patient will need to have a PICC line placed  - Will discuss option of PICC line with patient  - Dietician notified for consult, will follow up recs  - Consider advancing diet to regular if patient tolerates clears today   - D/w Dr. Young

## 2019-04-13 NOTE — DIETITIAN INITIAL EVALUATION ADULT. - NUTRITION INTERVENTION
Medical Food Supplements/Vitamin/Collaboration and Referral of Nutrition Care/Feeding Assistance/Meals and Snack

## 2019-04-13 NOTE — PROGRESS NOTE ADULT - SUBJECTIVE AND OBJECTIVE BOX
Patient seen at bedside resting comfortably offers no new complaints. States she has not had any vomiting since admission. She reports still feeling nauseated but is hungry.     Vital Signs Last 24 Hrs  T(C): 36.7 (13 Apr 2019 05:30), Max: 36.7 (12 Apr 2019 15:59)  T(F): 98.1 (13 Apr 2019 05:30), Max: 98.1 (12 Apr 2019 15:59)  HR: 68 (13 Apr 2019 05:30) (68 - 76)  BP: 87/49 (13 Apr 2019 05:30) (87/49 - 101/66)  BP(mean): --  RR: 17 (13 Apr 2019 05:30) (16 - 18)  SpO2: 100% (13 Apr 2019 05:30) (100% - 100%)    Gen: A&O x 3, NAD  Chest: CTA B/L  Cardiac: S1,S2  RRR  Abdomen: soft, gravid, NT   Gyn: no vaginal bleeding   Extremities: Nontender, no worsening edema                          7.9    6.65  )-----------( 187      ( 13 Apr 2019 06:56 )             24.2     04-13    139  |  108  |  5<L>  ----------------------------<  75  3.5   |  26  |  0.53    Ca    7.9<L>      13 Apr 2019 06:56    TPro  5.2<L>  /  Alb  2.3<L>  /  TBili  0.6  /  DBili  x   /  AST  14  /  ALT  18  /  AlkPhos  43  04-13

## 2019-04-13 NOTE — CHART NOTE - NSCHARTNOTEFT_GEN_A_CORE
Upon Nutritional Assessment by the Registered Dietitian your patient was determined to meet criteria / has evidence of the following diagnosis/diagnoses:          [ ]  Mild Protein Calorie Malnutrition        [ ]  Moderate Protein Calorie Malnutrition        [ X ] Severe Protein Calorie Malnutrition        [ ] Unspecified Protein Calorie Malnutrition        [ ] Underweight / BMI <19        [ ] Morbid Obesity / BMI > 40      Findings as based on:  •  Comprehensive nutrition assessment and consultation  •  Calorie counts (nutrient intake analysis)  •  Food acceptance and intake status from observations by staff  •  Follow up  •  Patient education  •  Intervention secondary to interdisciplinary rounds  •   concerns      Treatment:    The following diet has been recommended:     * Advance diet or consider nutritional supplement if Clear liquid diet prolonged as medically feasible;     * Add Ensure Clears 1can tid (600kcal, 21g protein),    * Prenatal MVI as medically feasible       PROVIDER Section:     By signing this assessment you are acknowledging and agree with the diagnosis/diagnoses assigned by the Registered Dietitian    Comments:

## 2019-04-13 NOTE — CHART NOTE - NSCHARTNOTEFT_GEN_A_CORE
Patient tolerating clears and not experiencing nausea at this time  Advance to regular diet  Repeat CBC this PM, if patient still anemic in Hgb ~7 consider transfusion per attending  D/w Dr. Young

## 2019-04-13 NOTE — DIETITIAN INITIAL EVALUATION ADULT. - OTHER INFO
Nutrition consult requested for assessment, education and kcal count, also verbally today by OB/GYN PA for poor intake, hyperemesis; IUP at 17wks, s Nutrition consult requested for assessment, education and kcal count, also verbally today by OB/GYN PA for poor intake, hyperemesis; pt currently 17 wks pregnancy, no expected wt gain, but wt loss from usual wt of 117 lb to 106 upon admission noted; still having nausea today, but not vomiting, some food choices obtained, willing to try Ensure Clears, reported inconsistent tolerance to Ensure Enlive; Nutrition consult requested for assessment, education and kcal count, also verbally today by OB/GYN PA for poor intake, hyperemesis; pt currently 17 wks pregnancy, no expected wt gain, but wt loss from usual wt of 117 lb to 106 upon admission noted; still having nausea today, but not vomiting, some food choices obtained, willing to try Ensure Clears, reported inconsistent tolerance to Ensure Enlive; pending PICC line for iv hydration noted

## 2019-04-14 LAB
HCT VFR BLD CALC: 30.6 % — LOW (ref 34.5–45)
HGB BLD-MCNC: 10.4 G/DL — LOW (ref 11.5–15.5)
MCHC RBC-ENTMCNC: 30.4 PG — SIGNIFICANT CHANGE UP (ref 27–34)
MCHC RBC-ENTMCNC: 34 GM/DL — SIGNIFICANT CHANGE UP (ref 32–36)
MCV RBC AUTO: 89.5 FL — SIGNIFICANT CHANGE UP (ref 80–100)
NRBC # BLD: 0 /100 WBCS — SIGNIFICANT CHANGE UP (ref 0–0)
PLATELET # BLD AUTO: 182 K/UL — SIGNIFICANT CHANGE UP (ref 150–400)
RBC # BLD: 3.42 M/UL — LOW (ref 3.8–5.2)
RBC # FLD: 15.2 % — HIGH (ref 10.3–14.5)
WBC # BLD: 7.09 K/UL — SIGNIFICANT CHANGE UP (ref 3.8–10.5)
WBC # FLD AUTO: 7.09 K/UL — SIGNIFICANT CHANGE UP (ref 3.8–10.5)

## 2019-04-14 RX ORDER — FAMOTIDINE 10 MG/ML
20 INJECTION INTRAVENOUS
Qty: 0 | Refills: 0 | Status: DISCONTINUED | OUTPATIENT
Start: 2019-04-14 | End: 2019-04-15

## 2019-04-14 RX ORDER — ONDANSETRON 8 MG/1
4 TABLET, FILM COATED ORAL EVERY 6 HOURS
Qty: 0 | Refills: 0 | Status: DISCONTINUED | OUTPATIENT
Start: 2019-04-14 | End: 2019-04-15

## 2019-04-14 RX ADMIN — Medication 25 MILLIGRAM(S): at 05:37

## 2019-04-14 RX ADMIN — FAMOTIDINE 20 MILLIGRAM(S): 10 INJECTION INTRAVENOUS at 05:37

## 2019-04-14 RX ADMIN — IRON SUCROSE 110 MILLIGRAM(S): 20 INJECTION, SOLUTION INTRAVENOUS at 17:56

## 2019-04-14 RX ADMIN — Medication 25 MILLIGRAM(S): at 22:41

## 2019-04-14 RX ADMIN — FAMOTIDINE 20 MILLIGRAM(S): 10 INJECTION INTRAVENOUS at 17:24

## 2019-04-14 RX ADMIN — ONDANSETRON 4 MILLIGRAM(S): 8 TABLET, FILM COATED ORAL at 23:02

## 2019-04-14 RX ADMIN — Medication 25 MILLIGRAM(S): at 00:24

## 2019-04-14 RX ADMIN — SODIUM CHLORIDE 125 MILLILITER(S): 9 INJECTION, SOLUTION INTRAVENOUS at 18:52

## 2019-04-14 RX ADMIN — ONDANSETRON 4 MILLIGRAM(S): 8 TABLET, FILM COATED ORAL at 17:24

## 2019-04-14 RX ADMIN — Medication 25 MILLIGRAM(S): at 13:46

## 2019-04-14 RX ADMIN — Medication 12.5 MILLIGRAM(S): at 23:02

## 2019-04-14 NOTE — PROGRESS NOTE ADULT - PROBLEM SELECTOR PLAN 1
-will transition to PO medications   -continue regular diet as tolerated   -Dietician notified for consult, will follow up recs  -Case management following for setting up IV zofran at home, stated yesterday that patient will need to have a PICC line placed. Attempted to call  today, not available   -will keep NPO after MN for possibility of PICC line placement in am   -D/w Dr. Young.

## 2019-04-14 NOTE — PROGRESS NOTE ADULT - SUBJECTIVE AND OBJECTIVE BOX
Patient seen at bedisde resting comfortably. Pt states she continues to have nausea, although she has not had vomitting overnight. Pt feels nausea while eating but is tolerating regual diet. + Ambulation, voiding without difficulty, + flatus. Denies HA, CP, SOB, N/V/D,  no bm; dizziness, palpitations, worsening abdominal pain, worsening vaginal bleeding, or any other concerns.     Vital Signs Last 24 Hrs  T(C): 36.8 (14 Apr 2019 05:20), Max: 37.1 (13 Apr 2019 22:15)  T(F): 98.2 (14 Apr 2019 05:20), Max: 98.7 (13 Apr 2019 22:15)  HR: 68 (14 Apr 2019 05:20) (62 - 83)  BP: 88/52 (14 Apr 2019 05:20) (82/51 - 104/52)  BP(mean): 58 (13 Apr 2019 21:08) (58 - 58)  RR: 18 (14 Apr 2019 05:20) (16 - 18)  SpO2: 100% (14 Apr 2019 05:20) (95% - 100%)    Gen: A&O x 3, NAD  Chest: CTA B/L  Cardiac: S1,S2  RRR  Breast: Soft, nontender, nonengorged  Abdomen: +BS; soft; Nontender, gravid   Gyn: no vaginal bleeding   Extremities: Nontender, no worsening edema                          10.4   7.09  )-----------( 182      ( 14 Apr 2019 07:15 )             30.6     04-13    139  |  108  |  5<L>  ----------------------------<  75  3.5   |  26  |  0.53    Ca    7.9<L>      13 Apr 2019 06:56    TPro  5.2<L>  /  Alb  2.3<L>  /  TBili  0.6  /  DBili  x   /  AST  14  /  ALT  18  /  AlkPhos  43  04-13      A/P: 28 year old HD#3 admitted for hyperemesis and anemia in pregnancy, s/p 2 units PRBC, stable   -will transition to PO medications   -continue regular diet as tolerated   -Dietician notified for consult, will follow up recs  -Case management following for setting up IV zofran at home, stated yesterday that patient will need to have a PICC line placed. Attempted to call  today, not available   -will keep NPO after MN for possibility of PICC line placement in am   -D/w Dr. Young.

## 2019-04-15 ENCOUNTER — TRANSCRIPTION ENCOUNTER (OUTPATIENT)
Age: 29
End: 2019-04-15

## 2019-04-15 VITALS
DIASTOLIC BLOOD PRESSURE: 61 MMHG | HEART RATE: 95 BPM | RESPIRATION RATE: 16 BRPM | TEMPERATURE: 98 F | OXYGEN SATURATION: 100 % | SYSTOLIC BLOOD PRESSURE: 105 MMHG

## 2019-04-15 PROCEDURE — 81001 URINALYSIS AUTO W/SCOPE: CPT

## 2019-04-15 PROCEDURE — 36430 TRANSFUSION BLD/BLD COMPNT: CPT

## 2019-04-15 PROCEDURE — 80053 COMPREHEN METABOLIC PANEL: CPT

## 2019-04-15 PROCEDURE — 76817 TRANSVAGINAL US OBSTETRIC: CPT

## 2019-04-15 PROCEDURE — 84478 ASSAY OF TRIGLYCERIDES: CPT

## 2019-04-15 PROCEDURE — 82009 KETONE BODYS QUAL: CPT

## 2019-04-15 PROCEDURE — P9040: CPT

## 2019-04-15 PROCEDURE — 86923 COMPATIBILITY TEST ELECTRIC: CPT

## 2019-04-15 PROCEDURE — 85027 COMPLETE CBC AUTOMATED: CPT

## 2019-04-15 PROCEDURE — 86901 BLOOD TYPING SEROLOGIC RH(D): CPT

## 2019-04-15 PROCEDURE — 36415 COLL VENOUS BLD VENIPUNCTURE: CPT

## 2019-04-15 PROCEDURE — 99285 EMERGENCY DEPT VISIT HI MDM: CPT | Mod: 25

## 2019-04-15 PROCEDURE — 84702 CHORIONIC GONADOTROPIN TEST: CPT

## 2019-04-15 PROCEDURE — 86900 BLOOD TYPING SEROLOGIC ABO: CPT

## 2019-04-15 PROCEDURE — 96374 THER/PROPH/DIAG INJ IV PUSH: CPT

## 2019-04-15 PROCEDURE — 76805 OB US >/= 14 WKS SNGL FETUS: CPT

## 2019-04-15 PROCEDURE — 84134 ASSAY OF PREALBUMIN: CPT

## 2019-04-15 PROCEDURE — 86850 RBC ANTIBODY SCREEN: CPT

## 2019-04-15 RX ORDER — DOXYLAMINE SUCCINATE AND PYRIDOXINE HYDROCHLORIDE, DELAYED RELEASE TABLETS 10 MG/10 MG 10; 10 MG/1; MG/1
2 TABLET, DELAYED RELEASE ORAL
Qty: 0 | Refills: 0 | COMMUNITY

## 2019-04-15 RX ORDER — FAMOTIDINE 10 MG/ML
1 INJECTION INTRAVENOUS
Qty: 0 | Refills: 0 | COMMUNITY
Start: 2019-04-15

## 2019-04-15 RX ORDER — ONDANSETRON 8 MG/1
4 TABLET, FILM COATED ORAL
Qty: 360 | Refills: 0 | OUTPATIENT
Start: 2019-04-15 | End: 2019-05-14

## 2019-04-15 RX ADMIN — Medication 25 MILLIGRAM(S): at 05:56

## 2019-04-15 RX ADMIN — ONDANSETRON 4 MILLIGRAM(S): 8 TABLET, FILM COATED ORAL at 05:56

## 2019-04-15 RX ADMIN — ONDANSETRON 4 MILLIGRAM(S): 8 TABLET, FILM COATED ORAL at 11:36

## 2019-04-15 RX ADMIN — Medication 25 MILLIGRAM(S): at 13:16

## 2019-04-15 RX ADMIN — FAMOTIDINE 20 MILLIGRAM(S): 10 INJECTION INTRAVENOUS at 05:58

## 2019-04-15 RX ADMIN — SODIUM CHLORIDE 125 MILLILITER(S): 9 INJECTION, SOLUTION INTRAVENOUS at 04:53

## 2019-04-15 NOTE — PROGRESS NOTE ADULT - SUBJECTIVE AND OBJECTIVE BOX
Patient seen at bedside resting comfortably offers no new complaints. States she feels tired and had not had a bowel movement sicne admission. + Ambulating, voiding without difficulty, + flatus; tolerating regular diet. Denies vaginal bleeding, LOF, or abdominal pain.     Vital Signs Last 24 Hrs  T(C): 36.8 (15 Apr 2019 05:27), Max: 37 (14 Apr 2019 22:07)  T(F): 98.2 (15 Apr 2019 05:27), Max: 98.6 (14 Apr 2019 22:07)  HR: 63 (15 Apr 2019 05:27) (63 - 81)  BP: 88/48 (15 Apr 2019 05:27) (88/48 - 95/56)  BP(mean): --  RR: 16 (15 Apr 2019 05:27) (16 - 16)  SpO2: 100% (15 Apr 2019 05:27) (100% - 100%)    Gen: A&O x 3, NAD  Chest: CTA B/L  Cardiac: S1,S2  RRR  Abdomen: +BS; soft; gravid, Nontender, nondistended  Gyn: no vaginal bleeding   Extremities: Nontender, no worsening edema                          10.4   7.09  )-----------( 182      ( 14 Apr 2019 07:15 )             30.6

## 2019-04-15 NOTE — DISCHARGE NOTE NURSING/CASE MANAGEMENT/SOCIAL WORK - NSDCDPATPORTLINK_GEN_ALL_CORE
You can access the [facility] Patient Portal, offered by [facility], by registering with the following website: [email or web address]

## 2019-04-15 NOTE — PROGRESS NOTE ADULT - PROBLEM SELECTOR PLAN 1
- patient HD stable  - per attending, d/w case management about possibility of Zofran IV at home (patient has been getting the Zofran sub cutaneous pump as well as home IV fluids, however states that she is uncomfortably with the subcutaneous pump and it is making her abdomen swollen)  - IR to d/w patient option of PICC line (risk vs benefit)  - continue regular diet   - case management for home care  - discharge planning   -d/w Dr. Young

## 2019-04-15 NOTE — DISCHARGE NOTE PROVIDER - NSDCCPCAREPLAN_GEN_ALL_CORE_FT
PRINCIPAL DISCHARGE DIAGNOSIS  Diagnosis: Hyperemesis  Assessment and Plan of Treatment: Please follow up with Dr. Young and [facility] doctor on Thursday 4/18 as scheduled  Your home visiting nurse will be at your house tomorrow to place your midline IV access.  You will receive IV fluids and IV Zofran through the midline access  You may continue taking the Zofran, Phergan, Diclegis, and Pepcid that you have at home PO as needed for nausea.  If you develop any worsening of symptoms, please contact your physician and return to the emergency department for evaluation      SECONDARY DISCHARGE DIAGNOSES  Diagnosis: Anemia in pregnancy, second trimester  Assessment and Plan of Treatment: Anemia in pregnancy, second trimester

## 2019-04-15 NOTE — CHART NOTE - NSCHARTNOTEFT_GEN_A_CORE
Patient seen and examined at bedside with Dr. Young  Patient adamant that she wants IV access for Zofran infusion at home  She states that she does not like the Zofran pump as it makes her abdomen swollen at the infusion site  Per case management, patient is candidate to have a midline placed and she can receive IV fluids and Zofran IVP through a midline access  Patient would like the midline access placed  D/w Case management, visiting nurse can come to house tomorrow AM and midline can be placed  - Zofran IVP rx given  - LR @75cc/hr x1 week   - Per attending, after one week patient should be re-assessed to see if she can be weened off IV medication and it will be addressed as to whether midline needs to be continued  - PO Vitamin B6, Pepcid, and Phenergan rx'd for discharge  - Patient understands and agrees with plan   - Discharge home when case management set up  - She will follow up with Dr. Young later in the week  - Any change or worsening of symptoms, patient to return to Emergency Department for evaluation. Patient seen and examined at bedside with Dr. Young  Patient adamant that she wants IV access for Zofran infusion at home  She states that she does not like the Zofran pump as it makes her abdomen swollen at the infusion site  Per case management, patient is candidate to have a midline placed and she can receive IV fluids and Zofran IVP through a midline access  Patient would like the midline access placed    FH ~150bpm by doppler today    D/w Case management, visiting nurse can come to house tomorrow AM and midline can be placed  - Zofran IVP rx given  - LR @75cc/hr x1 week   - Per attending, after one week patient should be re-assessed to see if she can be weened off IV medication and it will be addressed as to whether midline needs to be continued  - PO Vitamin B6, Pepcid, and Phenergan to be continued on discharge, patient reports she has enough of these medications at home   - Patient understands and agrees with plan   - Discharge home when case management set up  - She will follow up with Dr. Young later in the week  - Any change or worsening of symptoms, patient to return to Emergency Department for evaluation.

## 2019-04-15 NOTE — DISCHARGE NOTE PROVIDER - HOSPITAL COURSE
patient admitted through ED with hyperemesis gravidarum     patient was initially kept NPO and then advanced to clears, then regular diet    she was treated with Zofran, phenergan, and pepcid    on HD#2 patient noted to be anemic with Hgb of 7.9    She was transfused 2 units PRBC    Patient continued to remained HD stable    Case management was set up on HD#4 for patient to receive IV Zofran infusions, IV fluids, and midline intravascular access placement    Patient discharged home HD#4 when deemed medically and obstetrically stable with appropriate follow up

## 2019-04-16 ENCOUNTER — INBOUND DOCUMENT (OUTPATIENT)
Age: 29
End: 2019-04-16

## 2019-04-18 ENCOUNTER — ASOB RESULT (OUTPATIENT)
Age: 29
End: 2019-04-18

## 2019-04-18 ENCOUNTER — APPOINTMENT (OUTPATIENT)
Dept: ANTEPARTUM | Facility: CLINIC | Age: 29
End: 2019-04-18
Payer: COMMERCIAL

## 2019-04-18 PROCEDURE — 76817 TRANSVAGINAL US OBSTETRIC: CPT | Mod: 59

## 2019-04-18 PROCEDURE — 76811 OB US DETAILED SNGL FETUS: CPT

## 2020-02-01 NOTE — PATIENT PROFILE ADULT - SURGICAL SITE INCISION
Traci Alatorre Patient Age: 28 year old  MESSAGE:   Patient states she took antibiotics for bacterial infection  Patient states after taking the 7 day course she got a yeast infection  Patient tried over the counter medication but still has discharge  Patient would like to talk to nurse  Call connected to [facility] OB Triage Queue.  Routed to provider's clinical pool.  Message confirmed with caller.   Nurse line busy  Please call patient as soon as possible     WEIGHT AND HEIGHT:   Wt Readings from Last 1 Encounters:   12/05/19 83.9 kg (185 lb)     Ht Readings from Last 1 Encounters:   12/05/19 5' 4\" (1.626 m)     BMI Readings from Last 1 Encounters:   12/05/19 31.76 kg/m²       ALLERGIES: no known allergies.  Current Outpatient Medications   Medication   • metroNIDAZOLE (FLAGYL) 500 MG tablet   • spironolactone (ALDACTONE) 100 MG tablet     No current facility-administered medications for this visit.      PHARMACY to use: n/a          Pharmacy preference(s) on file:   [id number] [address]  [address] [facility] [address]  Phone: [phone] Fax: [phone]    [id number] - [address] AT [address] [address]  Phone: [phone] Fax: [phone]      CALL BACK INFO: Ok to leave response (including medical information) on answering machine  ROUTING: Patient's physician/staff        PCP: Anamika Plascencia DO         INS: Payor: [employer] / Plan: CHOICE/2400 / Product Type: PPO MISC   PATIENT ADDRESS:  [address] [place] [id number]  
Pt saw XIN for her annual 12/5/2019.  Pt states that she completed the flagyl in January. They she started to develop vaginal itchiness, white clumpy discharge. She tried Monistat 7 day - her symptoms have improved somewhat, but she still has a lot of discharge. Per standing orders, sent prescription for diflucan to pt's pharmacy.   
Sent to wrong provider pool    Sent to Dr. Hamm clinical support pool   
no

## 2020-04-05 ENCOUNTER — TRANSCRIPTION ENCOUNTER (OUTPATIENT)
Age: 30
End: 2020-04-05

## 2021-04-17 NOTE — ED PROVIDER NOTE - OBJECTIVE STATEMENT
27 y/o female () with no significant PMHx, currently 12 weeks pregnant, presents with nausea, vomiting, and inability to keep food down. Pt reports that whenever he eat anything it comes back out. Pt also reports associated dizziness described as lightheadedness that is triggered by positional change. Pt has tried Diclegis, Zofran, and promethazine without any relief of Sx. Pt was seen by Dr. Young a few days ago and was told to come back for IV hydration and treatment. Pt denies any chest pain, abd pain, urinary Sx, vaginal bleeding, unusual discharge, fever, chills, or other complaints. left ribs/yes 27 y/o female () with no significant PMHx, currently 12 weeks pregnant, presents with nausea, vomiting, and inability to keep food down. Pt reports that whenever he eat anything it comes back out. Pt also reports associated dizziness described as lightheadedness that is triggered by positional change. Pt has tried Diclegis, Zofran, and promethazine without any relief of Sx. Pt was seen by Dr. Young a few days ago and was told to come back for IV hydration and treatment. Pt denies any chest pain, abd pain, urinary Sx, vaginal bleeding, unusual discharge, fever, chills, or other complaints.  primo: 12 weeks pregnant with intractable nausea and vomiting

## 2022-01-28 NOTE — ED ADULT NURSE NOTE - BOWEL SOUNDS LLQ
Pt tolerating regular diet, voiding, vss, nad.  Pain managed well with ordered medication.     present

## 2023-05-24 NOTE — ED ADULT NURSE NOTE - ED STAT RN HANDOFF DETAILS
Patient's daughter Ubaldo Kerns checking status of request for a motorized scooter and home health
Spoke with the patients daughter Ms. Sean Sifuentes and informed her the patient still needs to book an appointment with PT as she was given the information to call and book appointment, then after PT appointment she would need to book a Face to Face appointment to see the provider for Follow-up PT for [address] [place] [address] for further evaluation. Ms. Sean Sifuentes acknowledged understanding and has no questions or concerns in regards to the matter, thank you.
Pt endorsed to TERRELL Winters. pt is stable.

## 2024-07-26 ENCOUNTER — NON-APPOINTMENT (OUTPATIENT)
Age: 34
End: 2024-07-26

## 2024-09-15 ENCOUNTER — NON-APPOINTMENT (OUTPATIENT)
Age: 34
End: 2024-09-15

## 2025-01-08 ENCOUNTER — NON-APPOINTMENT (OUTPATIENT)
Age: 35
End: 2025-01-08